# Patient Record
Sex: FEMALE | Race: WHITE | Employment: OTHER | URBAN - METROPOLITAN AREA
[De-identification: names, ages, dates, MRNs, and addresses within clinical notes are randomized per-mention and may not be internally consistent; named-entity substitution may affect disease eponyms.]

---

## 2020-07-22 ENCOUNTER — APPOINTMENT (OUTPATIENT)
Dept: ULTRASOUND IMAGING | Facility: CLINIC | Age: 55
End: 2020-07-22
Attending: PHYSICIAN ASSISTANT
Payer: COMMERCIAL

## 2020-07-22 ENCOUNTER — HOSPITAL ENCOUNTER (OUTPATIENT)
Facility: CLINIC | Age: 55
Setting detail: OBSERVATION
Discharge: HOME | End: 2020-07-24
Attending: PHYSICIAN ASSISTANT | Admitting: INTERNAL MEDICINE
Payer: COMMERCIAL

## 2020-07-22 DIAGNOSIS — R94.31 ABNORMAL ELECTROCARDIOGRAM: ICD-10-CM

## 2020-07-22 DIAGNOSIS — R03.0 ELEVATED BLOOD PRESSURE READING: Primary | ICD-10-CM

## 2020-07-22 DIAGNOSIS — R79.89 ELEVATED LFTS: ICD-10-CM

## 2020-07-22 DIAGNOSIS — Z90.49 S/P LAPAROSCOPIC CHOLECYSTECTOMY: ICD-10-CM

## 2020-07-22 DIAGNOSIS — K80.20 CHOLELITHIASES: ICD-10-CM

## 2020-07-22 DIAGNOSIS — R10.13 EPIGASTRIC PAIN: ICD-10-CM

## 2020-07-22 LAB
ALBUMIN SERPL-MCNC: 3.7 G/DL (ref 3.4–5)
ALBUMIN UR-MCNC: NEGATIVE MG/DL
ALP SERPL-CCNC: 161 U/L (ref 40–150)
ALT SERPL W P-5'-P-CCNC: 243 U/L (ref 0–50)
ANION GAP SERPL CALCULATED.3IONS-SCNC: 4 MMOL/L (ref 3–14)
APPEARANCE UR: CLEAR
AST SERPL W P-5'-P-CCNC: 415 U/L (ref 0–45)
BACTERIA #/AREA URNS HPF: ABNORMAL /HPF
BASOPHILS # BLD AUTO: 0 10E9/L (ref 0–0.2)
BASOPHILS NFR BLD AUTO: 0.2 %
BILIRUB DIRECT SERPL-MCNC: 0.5 MG/DL (ref 0–0.2)
BILIRUB SERPL-MCNC: 0.9 MG/DL (ref 0.2–1.3)
BILIRUB UR QL STRIP: NEGATIVE
BUN SERPL-MCNC: 8 MG/DL (ref 7–30)
CALCIUM SERPL-MCNC: 9.5 MG/DL (ref 8.5–10.1)
CHLORIDE SERPL-SCNC: 107 MMOL/L (ref 94–109)
CO2 SERPL-SCNC: 28 MMOL/L (ref 20–32)
COLOR UR AUTO: ABNORMAL
CREAT SERPL-MCNC: 0.7 MG/DL (ref 0.52–1.04)
DIFFERENTIAL METHOD BLD: NORMAL
EOSINOPHIL # BLD AUTO: 0.1 10E9/L (ref 0–0.7)
EOSINOPHIL NFR BLD AUTO: 0.8 %
ERYTHROCYTE [DISTWIDTH] IN BLOOD BY AUTOMATED COUNT: 12.5 % (ref 10–15)
GFR SERPL CREATININE-BSD FRML MDRD: >90 ML/MIN/{1.73_M2}
GLUCOSE SERPL-MCNC: 121 MG/DL (ref 70–99)
GLUCOSE UR STRIP-MCNC: NEGATIVE MG/DL
HCT VFR BLD AUTO: 42.4 % (ref 35–47)
HGB BLD-MCNC: 13.7 G/DL (ref 11.7–15.7)
HGB UR QL STRIP: NEGATIVE
IMM GRANULOCYTES # BLD: 0.1 10E9/L (ref 0–0.4)
IMM GRANULOCYTES NFR BLD: 0.6 %
KETONES UR STRIP-MCNC: NEGATIVE MG/DL
LEUKOCYTE ESTERASE UR QL STRIP: NEGATIVE
LIPASE SERPL-CCNC: 129 U/L (ref 73–393)
LYMPHOCYTES # BLD AUTO: 1.3 10E9/L (ref 0.8–5.3)
LYMPHOCYTES NFR BLD AUTO: 13.2 %
MCH RBC QN AUTO: 30.4 PG (ref 26.5–33)
MCHC RBC AUTO-ENTMCNC: 32.3 G/DL (ref 31.5–36.5)
MCV RBC AUTO: 94 FL (ref 78–100)
MONOCYTES # BLD AUTO: 0.6 10E9/L (ref 0–1.3)
MONOCYTES NFR BLD AUTO: 6.1 %
MUCOUS THREADS #/AREA URNS LPF: PRESENT /LPF
NEUTROPHILS # BLD AUTO: 7.8 10E9/L (ref 1.6–8.3)
NEUTROPHILS NFR BLD AUTO: 79.1 %
NITRATE UR QL: NEGATIVE
NRBC # BLD AUTO: 0 10*3/UL
NRBC BLD AUTO-RTO: 0 /100
PH UR STRIP: 7 PH (ref 5–7)
PLATELET # BLD AUTO: 235 10E9/L (ref 150–450)
POTASSIUM SERPL-SCNC: 3.9 MMOL/L (ref 3.4–5.3)
PROT SERPL-MCNC: 7.2 G/DL (ref 6.8–8.8)
RBC # BLD AUTO: 4.51 10E12/L (ref 3.8–5.2)
RBC #/AREA URNS AUTO: 0 /HPF (ref 0–2)
SODIUM SERPL-SCNC: 139 MMOL/L (ref 133–144)
SOURCE: ABNORMAL
SP GR UR STRIP: 1 (ref 1–1.03)
SQUAMOUS #/AREA URNS AUTO: 2 /HPF (ref 0–1)
TROPONIN I SERPL-MCNC: <0.015 UG/L (ref 0–0.04)
UROBILINOGEN UR STRIP-MCNC: NORMAL MG/DL (ref 0–2)
WBC # BLD AUTO: 9.9 10E9/L (ref 4–11)
WBC #/AREA URNS AUTO: 1 /HPF (ref 0–5)

## 2020-07-22 PROCEDURE — 93005 ELECTROCARDIOGRAM TRACING: CPT

## 2020-07-22 PROCEDURE — 84484 ASSAY OF TROPONIN QUANT: CPT | Performed by: PHYSICIAN ASSISTANT

## 2020-07-22 PROCEDURE — 80048 BASIC METABOLIC PNL TOTAL CA: CPT | Performed by: PHYSICIAN ASSISTANT

## 2020-07-22 PROCEDURE — 96375 TX/PRO/DX INJ NEW DRUG ADDON: CPT

## 2020-07-22 PROCEDURE — 80076 HEPATIC FUNCTION PANEL: CPT | Performed by: PHYSICIAN ASSISTANT

## 2020-07-22 PROCEDURE — U0003 INFECTIOUS AGENT DETECTION BY NUCLEIC ACID (DNA OR RNA); SEVERE ACUTE RESPIRATORY SYNDROME CORONAVIRUS 2 (SARS-COV-2) (CORONAVIRUS DISEASE [COVID-19]), AMPLIFIED PROBE TECHNIQUE, MAKING USE OF HIGH THROUGHPUT TECHNOLOGIES AS DESCRIBED BY CMS-2020-01-R: HCPCS | Performed by: PHYSICIAN ASSISTANT

## 2020-07-22 PROCEDURE — C9803 HOPD COVID-19 SPEC COLLECT: HCPCS

## 2020-07-22 PROCEDURE — 76705 ECHO EXAM OF ABDOMEN: CPT

## 2020-07-22 PROCEDURE — 96374 THER/PROPH/DIAG INJ IV PUSH: CPT

## 2020-07-22 PROCEDURE — 85025 COMPLETE CBC W/AUTO DIFF WBC: CPT | Performed by: PHYSICIAN ASSISTANT

## 2020-07-22 PROCEDURE — 83690 ASSAY OF LIPASE: CPT | Performed by: PHYSICIAN ASSISTANT

## 2020-07-22 PROCEDURE — 99285 EMERGENCY DEPT VISIT HI MDM: CPT | Mod: 25

## 2020-07-22 PROCEDURE — 25800030 ZZH RX IP 258 OP 636: Performed by: PHYSICIAN ASSISTANT

## 2020-07-22 PROCEDURE — 99220 ZZC INITIAL OBSERVATION CARE,LEVL III: CPT | Performed by: PHYSICIAN ASSISTANT

## 2020-07-22 PROCEDURE — 81001 URINALYSIS AUTO W/SCOPE: CPT | Performed by: PHYSICIAN ASSISTANT

## 2020-07-22 PROCEDURE — 25000128 H RX IP 250 OP 636: Performed by: PHYSICIAN ASSISTANT

## 2020-07-22 PROCEDURE — 96361 HYDRATE IV INFUSION ADD-ON: CPT

## 2020-07-22 PROCEDURE — G0378 HOSPITAL OBSERVATION PER HR: HCPCS

## 2020-07-22 RX ORDER — HYDROMORPHONE HYDROCHLORIDE 1 MG/ML
0.2 INJECTION, SOLUTION INTRAMUSCULAR; INTRAVENOUS; SUBCUTANEOUS
Status: DISCONTINUED | OUTPATIENT
Start: 2020-07-22 | End: 2020-07-23

## 2020-07-22 RX ORDER — OXYCODONE HYDROCHLORIDE 5 MG/1
5 TABLET ORAL EVERY 4 HOURS PRN
Status: DISCONTINUED | OUTPATIENT
Start: 2020-07-22 | End: 2020-07-23

## 2020-07-22 RX ORDER — ACETAMINOPHEN 650 MG/1
650 SUPPOSITORY RECTAL EVERY 4 HOURS PRN
Status: DISCONTINUED | OUTPATIENT
Start: 2020-07-22 | End: 2020-07-24 | Stop reason: HOSPADM

## 2020-07-22 RX ORDER — PROCHLORPERAZINE MALEATE 5 MG
10 TABLET ORAL EVERY 6 HOURS PRN
Status: DISCONTINUED | OUTPATIENT
Start: 2020-07-22 | End: 2020-07-24 | Stop reason: HOSPADM

## 2020-07-22 RX ORDER — AMOXICILLIN 250 MG
1 CAPSULE ORAL 2 TIMES DAILY PRN
Status: DISCONTINUED | OUTPATIENT
Start: 2020-07-22 | End: 2020-07-24 | Stop reason: HOSPADM

## 2020-07-22 RX ORDER — FAMOTIDINE 20 MG
1 TABLET ORAL DAILY
Status: ON HOLD | COMMUNITY
End: 2020-07-22

## 2020-07-22 RX ORDER — UBIDECARENONE 200 MG
1 CAPSULE ORAL DAILY
COMMUNITY

## 2020-07-22 RX ORDER — PROCHLORPERAZINE 25 MG
25 SUPPOSITORY, RECTAL RECTAL EVERY 12 HOURS PRN
Status: DISCONTINUED | OUTPATIENT
Start: 2020-07-22 | End: 2020-07-24 | Stop reason: HOSPADM

## 2020-07-22 RX ORDER — ONDANSETRON 4 MG/1
4 TABLET, ORALLY DISINTEGRATING ORAL EVERY 6 HOURS PRN
Status: DISCONTINUED | OUTPATIENT
Start: 2020-07-22 | End: 2020-07-24 | Stop reason: HOSPADM

## 2020-07-22 RX ORDER — AMOXICILLIN 250 MG
2 CAPSULE ORAL 2 TIMES DAILY PRN
Status: DISCONTINUED | OUTPATIENT
Start: 2020-07-22 | End: 2020-07-24 | Stop reason: HOSPADM

## 2020-07-22 RX ORDER — SODIUM CHLORIDE, SODIUM LACTATE, POTASSIUM CHLORIDE, CALCIUM CHLORIDE 600; 310; 30; 20 MG/100ML; MG/100ML; MG/100ML; MG/100ML
INJECTION, SOLUTION INTRAVENOUS CONTINUOUS
Status: DISCONTINUED | OUTPATIENT
Start: 2020-07-22 | End: 2020-07-24 | Stop reason: HOSPADM

## 2020-07-22 RX ORDER — ACETAMINOPHEN 325 MG/1
650 TABLET ORAL EVERY 4 HOURS PRN
Status: DISCONTINUED | OUTPATIENT
Start: 2020-07-22 | End: 2020-07-23

## 2020-07-22 RX ORDER — ONDANSETRON 2 MG/ML
4 INJECTION INTRAMUSCULAR; INTRAVENOUS EVERY 6 HOURS PRN
Status: DISCONTINUED | OUTPATIENT
Start: 2020-07-22 | End: 2020-07-24 | Stop reason: HOSPADM

## 2020-07-22 RX ORDER — ACETAMINOPHEN 325 MG/1
325-650 TABLET ORAL EVERY 6 HOURS PRN
COMMUNITY

## 2020-07-22 RX ORDER — NALOXONE HYDROCHLORIDE 0.4 MG/ML
.1-.4 INJECTION, SOLUTION INTRAMUSCULAR; INTRAVENOUS; SUBCUTANEOUS
Status: DISCONTINUED | OUTPATIENT
Start: 2020-07-22 | End: 2020-07-24 | Stop reason: HOSPADM

## 2020-07-22 RX ORDER — HYDRALAZINE HYDROCHLORIDE 20 MG/ML
10 INJECTION INTRAMUSCULAR; INTRAVENOUS EVERY 4 HOURS PRN
Status: DISCONTINUED | OUTPATIENT
Start: 2020-07-22 | End: 2020-07-23

## 2020-07-22 RX ADMIN — HYDRALAZINE HYDROCHLORIDE 10 MG: 20 INJECTION INTRAMUSCULAR; INTRAVENOUS at 21:21

## 2020-07-22 RX ADMIN — ONDANSETRON 4 MG: 2 INJECTION INTRAMUSCULAR; INTRAVENOUS at 18:36

## 2020-07-22 RX ADMIN — HYDROMORPHONE HYDROCHLORIDE 0.2 MG: 1 INJECTION, SOLUTION INTRAMUSCULAR; INTRAVENOUS; SUBCUTANEOUS at 18:41

## 2020-07-22 RX ADMIN — SODIUM CHLORIDE, POTASSIUM CHLORIDE, SODIUM LACTATE AND CALCIUM CHLORIDE: 600; 310; 30; 20 INJECTION, SOLUTION INTRAVENOUS at 17:51

## 2020-07-22 ASSESSMENT — MIFFLIN-ST. JEOR
SCORE: 1729.87
SCORE: 1781.13
SCORE: 1781.13

## 2020-07-22 ASSESSMENT — ENCOUNTER SYMPTOMS
VOMITING: 0
NAUSEA: 1
COUGH: 0
CHILLS: 1
FEVER: 0
ROS GI COMMENTS: GAS
SHORTNESS OF BREATH: 1
DIAPHORESIS: 1
BLOOD IN STOOL: 0
ABDOMINAL PAIN: 1

## 2020-07-22 NOTE — PLAN OF CARE
ROOM # 207    Living Situation (if not independent, order SW consult): Home w/ spouse and 2 children (adult children).   Facility name:  : Ejd: [phone]   Activity level at baseline: Independent  Activity level on admit: SBA    Patient registered to observation; given Patient Bill of Rights; given the opportunity to ask questions about observation status and their plan of care.  Patient has been oriented to the observation room, bathroom and call light is in place.    Discussed discharge goals and expectations with patient/family.

## 2020-07-22 NOTE — H&P
[facility] [place]    History and Physical - Hospitalist Service       Date of Admission:  7/22/2020    Assessment & Plan   Nan Stinson is a 54 year old female admitted on 7/22/2020 fo abdominal pain.    Symptomatic cholelithiasis with transaminitis  --RUQ US with cholelithiasis, stone in gallbladder neck with elevated LFTs.  Symptoms consistent with symptomatic cholelithiasis.  No radiographic evidence of cholecystitis, no leukocytosis or fever to necessitate IV antibiotics    PLAN:  --IVF  --clears tonight, NPO p MN in case of procedure  --surgery consult  --repeat LFTs in AM  --PRN oxycodone, IV dilaudid for breakthrough pain    Obesity       Diet: NPO until surgical evaluation/plan  DVT Prophylaxis: contraindicated due to possible procedure  Mckeon Catheter: not present  Code Status: FULL         Disposition Plan   Expected discharge: Tomorrow, recommended to prior living arrangement once pain controlled, surgical evaluation complete.  Entered: Rmaila Frey PA-C 07/22/2020, 3:47 PM         Ramila Frey PA-C  [facility] [place]    ______________________________________________________________________    Chief Complaint   Abdominal pain    History is obtained from the patient    History of Present Illness   Nan Stinson is a 54 year old female who presents with acute upper abdominal pain over past 3-4 days.  Notes band-like pain over epigastrium and radiating to RUQ.  Pain is episodic, lasting 30 minutes - 1 hour each time with total of 4 episodes over past 3-4 days.  Pain 9/10 at its worst.  No relation to eating.  No true dyspnea unless she is having pain.  Pain is similar but more severe and diffuse as compared to previous episodes of biliary colic.  Last had issues with biliary colic over 10 years ago.  Has felt nauseated but no emesis.  Currently feels uncomfortable but no current pain.  Took four pills of acetaminophen, unknown strength, and a couple of aspirin  without significant improvement.  Overnight last night and into this morning, the pain was unrelenting and subsequently she sought attention in the ED.      Review of Systems    The 10 point Review of Systems is negative other than noted in the HPI or here.     Past Medical History    I have reviewed this patient's medical history and updated it with pertinent information if needed.   Cholelithiasis  Pre-hypertension  Unspecified arrhythmia, details unclear    Past Surgical History   I have reviewed this patient's surgical history and updated it with pertinent information if needed.  No previous surgeries    Social History   I have reviewed this patient's social history and updated it with pertinent information if needed.  Lives with significant other.  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  Has 4-9 drinks per week.  Soon to be a new grandparent.        Family History     Both parents with adult-onset diabetes    Prior to Admission Medications   None   Tarina's wort  CoQ10    Allergies   No Known Allergies    Physical Exam   Vital Signs: Temp: 98.6  F (37  C) Temp src: Oral BP: (!) 150/80 Pulse: 64 Heart Rate: 58 Resp: 14 SpO2: 100 %      Weight: 250 lbs 0 oz    Constitutional: comfortable appearing woman sitting up in bed  Eyes: no icterus  HEENT: mucous membranes moist  Respiratory: clear bilaterally  Cardiovascular: RRR, no murmur  GI: normoactive bowel sounds, soft, nontender, no distention.  Negative miranda's sign  Lymph/Hematologic: no bruising  Genitourinary: no catheter  Skin: warm and dry  Musculoskeletal: normal muscle bulk and tone  Neurologic: nonfocal  Psychiatric: alert, oriented, appropriate.         Data   Data reviewed today: I reviewed all medications, new labs and imaging results over the last 24 hours. I personally reviewed the EKG tracing showing NSR, no acute ischemic changes.    Recent Labs   Lab 07/22/20  1309   WBC 9.9   HGB 13.7   MCV 94         POTASSIUM 3.9   CHLORIDE 107   CO2  28   BUN 8   CR 0.70   ANIONGAP 4   WALTER 9.5   *   ALBUMIN 3.7   PROTTOTAL 7.2   BILITOTAL 0.9   ALKPHOS 161*   *   *   LIPASE 129   TROPI <0.015     Recent Results (from the past 24 hour(s))   US Abdomen Limited    Narrative    ULTRASOUND ABDOMEN LIMITED 7/22/2020 2:30 PM    CLINICAL HISTORY: Right upper quadrant pain. Gallstones, right upper  quadrant.    TECHNIQUE: Limited abdominal ultrasound.    COMPARISON: None.    FINDINGS:    GALLBLADDER: Negative sonographic Morales sign. Cholelithiasis. A  gallstone is noted at the gallbladder neck. Distended gallbladder. No  gallbladder wall thickening.    BILE DUCTS: There is no biliary dilatation. The common duct measures  4mm.    LIVER: Fatty liver. No focal liver lesion identified. Liver is 17.5  cm.    RIGHT KIDNEY: No hydronephrosis.    PANCREAS: The visualized portions of the pancreas are normal.    No ascites.      Impression    IMPRESSION:  1.  Negative sonographic Morales sign. However, there is cholelithiasis  with a gallstone seen at the gallbladder neck and there is distention  of the gallbladder. No biliary dilatation.  2.  Fatty liver. Mildly enlarged liver.    TRENT QUIROZ MD

## 2020-07-22 NOTE — ED PROVIDER NOTES
"  History     Chief Complaint:  Abdominal Pain    HPI   Nan Stinson is a 54 year old female with a history of gallstones and heart arrhythmia who presents with abdominal pain. The patient reports that for the past two week she has had episodic pain in her upper abdomen and for the past 48 hours this is become more constant.  She denies fever, chest pain, shortness of breath.  She has felt nauseous but has not vomited.  No pain with urination.  No bloody stools and she has been having normal bowel movements.  She does note a prior history of gallbladder episode about 10 years ago.  She denies any cough.  She notes that in the past hour or 2 the pain has let up somewhat.  She notes she drinks alcohol socially but no more than 7-10 drinks per week.  She denies a history of smoking.  No family history of early CHF.  Allergies:  No Known Drug Allergies     Medications:    Medications reviewed. No pertinent medications.    Past Medical History:    Depressive disorder  Elevated blood pressure  Edema   Palpitations   Lipoma of other skin and subcutaneous tissue  Allergic rhinitis    Past Surgical History:    Surgical history reviewed. No pertinent surgical history.    Family History:    Father: heart disease    Social History:  Smoking Status: Never Smoker  Smokeless Tobacco: Never Used  Alcohol Use: Negative  Drug Use: Negative  Marital Status:       Review of Systems   All other systems reviewed and are negative.    Physical Exam     Patient Vitals for the past 24 hrs:   BP Temp Temp src Pulse Heart Rate Resp SpO2 Height Weight   07/22/20 1431 -- -- -- -- 58 14 100 % -- --   07/22/20 1430 (!) 150/80 -- -- 64 60 -- -- -- --   07/22/20 1253 (!) 176/89 98.6  F (37  C) Oral 72 -- 20 100 % 1.651 m (5' 5\") 113.4 kg (250 lb)     Physical Exam  General: Alert and cooperative with exam. Resting comfortably on gurney  Head:  Scalp is NC/AT  Eyes:  No scleral icterus, PERRL  ENT:  The external nose and ears are " normal.   Neck:  Normal range of motion without rigidity.  CV:  Regular rate and rhythm    No pathologic murmur, rubs, or gallops.  Resp:  Breath sounds are clear bilaterally.  No crackles, wheezes, rhonchi, stridor.    Non-labored, no retractions or accessory muscle use  GI:  Abdomen is soft, no distension, Minimal epigastric and RUQ tenderness, no masses. No peritoneal signs.  Bowel sounds present in all quadrants  :  No suprapubic or flank tenderness  MS:  No lower extremity edema or asymmetric calf swelling. Normal ROM in all joints without effusions.    No midline cervical, thoracic, or lumbar tenderness  Skin:  Warm and dry, No rash or lesions noted. 2+ peripheral pulses in all extremities  Neuro: Oriented. No gross motor deficits. GCS 15    Strength and sensation grossly intact in all 4 extremities.   Psych: Awake. Alert. Normal affect. Appropriate interactions.    Emergency Department Course     ECG:  ECG taken at 1356  Normal sinus rhythm   Incomplete right bundle branch block   ST and T wave abnormality, consider inferior ischemia   Abnormal ECG  Mild depression 11, 111, aVF S V3-V5   Rate 65 bpm. MN interval 174 ms. QRS duration 96 ms. QT/QTc 400/416 ms. P-R-T axes 66 23 -1.    Imaging:  Radiology findings were communicated with the patient who voiced understanding of the findings.    US Abdomen Limited  1.  Negative sonographic Morales sign. However, there is cholelithiasis  with a gallstone seen at the gallbladder neck and there is distention  of the gallbladder. No biliary dilatation.  2.  Fatty liver. Mildly enlarged liver.   Reading per radiology     Laboratory:  Laboratory findings were communicated with the patient  who voiced understanding of the findings.    CBC: WBC 9.9, HGB 13.7,   BMP: glucose 121 (H) o/w WNL (Creatinine 0.70)  Hepatic panel: bilirubin direct 0.5 (H), alkphos 161 (H), ast 415 (H)  Troponin (Collected 1309): <0.015  Lipase: 129   UA with micro: bacteria few (!),  squamous epithelial urine 2 (H), mucous urine present (!) o/w negative    Emergency Department Course:   Nursing notes and vitals reviewed. I performed an exam of the patient as documented above.     1309 IV was inserted and blood was drawn for laboratory testing, results above.    1309 The patient provided a urine sample here in the emergency department. This was sent for laboratory testing, findings above.    1356 EKG obtained as noted above.    1408 The patient was sent for a US while in the emergency department, results above.      I spoke with Dr. Jiménez of the hospitalist service from [facility] regarding patient's presentation, findings, and plan of care.     Prior to admit, I personally reviewed the results with the patient and all related questions were answered. The patient verbalized understanding and is amenable to plan.     Findings and plan explained to the Patient who consents to admission. Discussed the patient with Dr. Jiménez, who will admit the patient to a observation bed for further monitoring, evaluation, and treatment.    Impression & Plan    Medical Decision Making:  Nan Stinson is a 54 year old female who presents to the emergency department today for evaluation of epigastric abdominal pain.  History and records reviewed.  Work-up here notable for elevated LFTs, right upper quadrant ultrasound showing cholelithiasis with stone in gallbladder neck.  This is the likely etiology of the patient's symptoms.  Discussed with surgery who will consult on the patient and plan for cholecystectomy tomorrow.  bilirubin not significantly elevated and no obvious evidence of choledocholithiasis at this time.  Afebrile with normal white count and no signs of cholangitis.  Abdominal exam is otherwise benign with no lower abdominal tenderness to suggest appendicitis or diverticulitis.  EKG does show some concerning ST depression, though troponin is undetectable and I do feel her symptoms are  most likely due to the gallbladder.  Nevertheless she will be admitted for serial troponins and further evaluation.  No other chest pain and shortness of breath and I feel other etiologies such as pulmonary embolism or aortic dissection are very unlikely.  Discussed with hospitalist who agrees to accept the patient for admission.  Patient consents to admission and all questions were answered.    Diagnosis:    ICD-10-CM    1. Elevated LFTs  R79.89    2. Abnormal electrocardiogram  R94.31    3. Epigastric pain  R10.13    4. Cholelithiases  K80.20      Disposition:   The patient is admitted into the care of Dr. Jiménez.    Scribe Disclosure:  I, Cassandra Stinson, am serving as a scribe at 1:44 PM on 7/22/2020 to document services personally performed by Obie Nevarez, based on my observations and the provider's statements to me.       Obie Nevarez PA-C  07/22/20 1921

## 2020-07-22 NOTE — PHARMACY-ADMISSION MEDICATION HISTORY
Admission medication history interview status for this patient is complete. See [facility] admission navigator for allergy information, prior to admission medications and immunization status.     Medication history interview done via telephone during Covid-19 pandemic, indicate source(s): Patient  Medication history resources (including written lists, pill bottles, clinic record):None    Changes made to PTA medication list:  Added: All  Deleted: None  Changed: None    Actions taken by pharmacist (provider contacted, etc):None     Additional medication history information:None    Medication reconciliation/reorder completed by provider prior to medication history?  No   (Y/N)     For patients on insulin therapy: No  (Y/N)      Prior to Admission medications    Medication Sig Last Dose Taking? Auth Provider   acetaminophen (TYLENOL) 325 MG tablet Take 325-650 mg by mouth every 6 hours as needed for mild pain  Yes Unknown, Entered By History   coenzyme Q-10 200 MG CAPS Take 1 capsule by mouth daily 7/22/2020 at Unknown time Yes Unknown, Entered By History   UNABLE TO FIND Take by mouth daily MEDICATION NAME: A tincture containing Hilary's Wort and Ashwagandha 7/22/2020 at Unknown time Yes Unknown, Entered By History

## 2020-07-22 NOTE — ED TRIAGE NOTES
"3 day hx of intermittent abdominal pain. Pt states \"its a tight band across my upper abdomen that goes to my back.\" Pt c/o nausea.   "

## 2020-07-22 NOTE — ED PROVIDER NOTES
History     Chief Complaint:  Abdominal Pain    HPI   Nan Stinson is a 54 year old female with a history of gallstones and heart arrhythmia who presents with abdominal pain. The patient reports that for the past two week she has had episodic pain in her upper abdomen and for the past three days the pain has been present everyday. She has associated nausea, gas, cold sweats, and difficulty breathing. She states the pain was a 9/10 this morning, but has been fluctuating throughout the day and currently she says her abdomen just feels like there is pressure. The pain is different to her previous gallstones, as she said that this pain is more broader and radiates more to her back and that the pain from the gallstones were related to food intake . She took acetaminophen and water for pain management, but the pain never completely went away. She went to urgent care for her pain, but was directed to the emergency department. She denies vomiting, fever, cough, bloody stools, leg pain, leg swelling, or surgery on her abdomen.     Allergies:  No Known Drug Allergies     Medications:    Medications reviewed. No pertinent medications.    Past Medical History:    Depressive disorder  Elevated blood pressure  Edema   Palpitations   Lipoma of other skin and subcutaneous tissue  Allergic rhinitis    Past Surgical History:    Surgical history reviewed. No pertinent surgical history.    Family History:    Father: heart disease    Social History:  Smoking Status: Never Smoker  Smokeless Tobacco: Never Used  Alcohol Use: Negative  Drug Use: Negative  Marital Status:         Review of Systems   Constitutional: Positive for chills and diaphoresis. Negative for fever.   Respiratory: Positive for shortness of breath. Negative for cough.    Cardiovascular: Negative for leg swelling.   Gastrointestinal: Positive for abdominal pain and nausea. Negative for blood in stool and vomiting.        Gas   Musculoskeletal:         "No leg pain     ***    Physical Exam     Patient Vitals for the past 24 hrs:   BP Temp Temp src Pulse Resp SpO2 Height Weight   07/22/20 1253 (!) 176/89 98.6  F (37  C) Oral 72 20 100 % 1.651 m (5' 5\") 113.4 kg (250 lb)     Physical Exam  ***  General: Alert and cooperative with exam. Resting comfortably on gurney  Head:  Scalp is NC/AT  Eyes:  No scleral icterus, PERRL  ENT:  The external nose and ears are normal.     ***The oropharynx is normal and without erythema or tonsillar swelling. Uvula midline, no submandibular swelling, sublingual swelling, trismus.  TM's normal BL, no mastoid swelling or tenderness***  Neck:  Normal range of motion without rigidity.  CV:  Regular rate and rhythm    No pathologic murmur, rubs, or gallops.  Resp:  Breath sounds are clear bilaterally.  No crackles, wheezes, rhonchi, stridor.    Non-labored, no retractions or accessory muscle use  GI:  Abdomen is soft, no distension, no tenderness, no masses. No peritoneal signs.  Bowel sounds present in all quadrants  :  No suprapubic or flank tenderness  MS:  No lower extremity edema or asymmetric calf swelling. Normal ROM in all joints without effusions.    No midline cervical, thoracic, or lumbar tenderness  Skin:  Warm and dry, No rash or lesions noted. 2+ peripheral pulses in all extremities  Neuro:  Oriented. No gross motor deficits. GCS 15    ***Strength and sensation grossly intact in all 4 extremities.  Cranial nerves 2-12 intact.  Normal finger to nose and heel to shin testing.  Gait normal***  Psych: Awake. Alert. Normal affect. Appropriate interactions.    Emergency Department Course     ECG:  ECG taken at ***, ECG read at ***  Normal sinus rhythm***  *** ECG  ***  Rate *** bpm. MI interval *** ms. QRS duration *** ms. QT/QTc *** ms. P-R-T axes *** *** ***.    Imaging:  Radiology findings were communicated with the *** who voiced understanding of the findings.    No orders to display        Reading per radiology " "    Laboratory:  Laboratory findings were communicated with the *** who voiced understanding of the findings.    ***  CBC: WBC ***, HGB ***, PLT ***  ***MP: *** o/w WNL (Creatinine ***)  ***    Procedures:    Procedures:      Interventions:  Medications - No data to display     Emergency Department Course:    *** Nursing notes and vitals reviewed. I performed an exam of the patient as documented above.     *** ***    *** ***    *** Prior to ***, I personally reviewed the results with the *** and all related questions were answered. The *** verbalized understanding and is amenable to plan.     Impression & Plan      CMS Diagnoses: {Sepsis/Septic Shock/Stemi/Stroke:783956::\" \"}     {trauma activation?:222192::\" \"}    Medical Decision Making:  Nan Stinson is a 54 year old female who presents to the emergency department today for evaluation of ***    Critical Care time was *** minutes for this patient excluding procedures.     Diagnosis:  ***  No diagnosis found.    Disposition:   ***    Discharge Medications:  ***  New Prescriptions    No medications on file     Scribe Disclosure:  I, Cassandra Stinson, am serving as a scribe at 1:44 PM on 7/22/2020 to document services personally performed by Obie Nevarez, based on my observations and the provider's statements to me.    "

## 2020-07-22 NOTE — ED PROVIDER NOTES
Emergency Department Attending Supervision Note  7/22/2020  2:43 PM      I evaluated this patient in conjunction with Obie Nevarez PA-C.      Briefly, the patient presented for evaluation of epigastric pain. Over the last two weeks, the patient states this pain has been intermittent, but it has been constant in the last three days. It also radiates into her back. It is associated with nausea, distention, diaphoresis, and difficulty breathing. It feels different from her previous gallstone pain and it is not associated with eating. She has been taking Tylenol for the pain at home without complete relief. She denies any vomiting, stool changes, fever, cough, shortness of breath, or other concerning symptoms. No history of abdominal surgery.       On my exam,   General: Resting on the bed.  Head: No obvious trauma to head.  Ears, Nose, Throat:  External ears normal.  Nose normal.    Eyes:  Conjunctivae clear.  Pupils are equal, round, and reactive.   Neck: Normal range of motion.  Neck supple.   CV: Regular rate and rhythm.  No murmurs.  2+ radial pulses     Respiratory: Effort normal and breath sounds normal.  No wheezing or crackles.   Gastrointestinal: Soft.  No distension. There is epigastric and RUQ tenderness.  There is no rigidity, no rebound and no guarding.   Musculoskeletal: Non tender non edematous calves  Neuro: Alert. Moving all extremities appropriately.  Normal speech.    Skin: Skin is warm and dry.  No rash noted.     Results:     ECG:  ECG taken at 1356  Normal sinus rhythm   Incomplete right bundle branch block   ST and T wave abnormality, consider inferior ischemia   Abnormal ECG  Mild depression 11, 111, aVF S V3-V5   Rate 65 bpm. NJ interval 174 ms. QRS duration 96 ms. QT/QTc 400/416 ms. P-R-T axes 66 23 -1.    Imaging:   US Abdomen Limited   Preliminary Result   IMPRESSION:   1.  Negative sonographic Morales sign. However, there is cholelithiasis   with a gallstone seen at the gallbladder neck and  there is distention   of the gallbladder. No biliary dilatation.   2.  Fatty liver. Mildly enlarged liver.         Laboratory:   Labs Ordered and Resulted from Time of ED Arrival Up to the Time of Departure from the ED   BASIC METABOLIC PANEL - Abnormal; Notable for the following components:       Result Value    Glucose 121 (*)     All other components within normal limits   ROUTINE UA WITH MICROSCOPIC - Abnormal; Notable for the following components:    Bacteria Urine Few (*)     Squamous Epithelial /HPF Urine 2 (*)     Mucous Urine Present (*)     All other components within normal limits   HEPATIC PANEL - Abnormal; Notable for the following components:    Bilirubin Direct 0.5 (*)     Alkaline Phosphatase 161 (*)      (*)      (*)     All other components within normal limits   LIPASE   CBC WITH PLATELETS DIFFERENTIAL   TROPONIN I        ED course:  1440: Obie Nevarez PA-C, shared service with me as he plans to admit the patient. Please see his note for the initial work up.     1447: I performed an exam of the patient, as documented above.     MDM:  Nan Stinson is a 54 year old female who presents with abdominal pain concerning for biliary colic.  Vital signs are reassuring.  Broad differential was pursued including not limited to cholelithiasis, choledocholithiasis, hepatitis, pancreatitis, ACS, arrhythmia, electrolyte, metabolic, renal dysfunction.  CBC shows no leukocytosis or anemia.  BMP shows no acute electrolyte, metabolic or renal dysfunction.  LFTs do show an elevated ALT, AST and alk phos.  Lipase is normal not concerning for pancreatitis.   Ultrasound shows cholelithiasis, as well as a gallstone seen at the gallbladder neck with associated distention.  With elevated LFTs and evidence of cholelithiasis will admit for possible surgical consultation and cholecystectomy.  No evidence of acute cholecystitis.  EKG was obtained and did show evidence of ST depression, concerning for  possible acute coronary syndrome.  Patient denies any chest pain or shortness of breath at this time.  Troponin is negative.  No prior EKGs for comparison, therefore will admit for ACS rule out.  Given cholelithiasis with elevated LFTs and abnormal EKG, plan will be to admit to the hospital for further work up. Obie Nevarez PA-C, spoke with the hospitalist who accepts the patient for admission.      Diagnosis    ICD-10-CM    1. Elevated LFTs  R79.89    2. Abnormal electrocardiogram  R94.31    3. Epigastric pain  R10.13    4. Cholelithiases  K80.20          Jazmine Galicia MD Bennett, Jennifer L, MD  07/22/20 1548

## 2020-07-22 NOTE — ED NOTES
"[facility]  ED Nurse Handoff Report    Nan Stinson is a 54 year old female   ED Chief complaint: Abdominal Pain  . ED Diagnosis:   Final diagnoses:   None     Allergies: No Known Allergies    Code Status: Full Code  Activity level - Baseline/Home:  Independent. Activity Level - Current:   Independent. Lift room needed: No. Bariatric: No   Needed: No   Isolation: No. Infection: Not Applicable.     Vital Signs:   Vitals:    07/22/20 1253 07/22/20 1430 07/22/20 1431   BP: (!) 176/89 (!) 150/80    Pulse: 72 64    Resp: 20  14   Temp: 98.6  F (37  C)     TempSrc: Oral     SpO2: 100%  100%   Weight: 113.4 kg (250 lb)     Height: 1.651 m (5' 5\")         Cardiac Rhythm:  ,      Pain level: 0-10 Pain Scale: 9  Patient confused: No. Patient Falls Risk: No.   Elimination Status: Has voided   Patient Report - Initial Complaint: Abdominal Pain. Focused Assessment: Patient reports intermittent, bilateral upper quadrant pain. Nausea associated with the pain. Denies changes to bowel or bladder.  Tests Performed: EKG, Lab work, UA, Ultrasound. Abnormal Results:   Labs Ordered and Resulted from Time of ED Arrival Up to the Time of Departure from the ED   BASIC METABOLIC PANEL - Abnormal; Notable for the following components:       Result Value    Glucose 121 (*)     All other components within normal limits   ROUTINE UA WITH MICROSCOPIC - Abnormal; Notable for the following components:    Bacteria Urine Few (*)     Squamous Epithelial /HPF Urine 2 (*)     Mucous Urine Present (*)     All other components within normal limits   HEPATIC PANEL - Abnormal; Notable for the following components:    Bilirubin Direct 0.5 (*)     Alkaline Phosphatase 161 (*)      (*)      (*)     All other components within normal limits   LIPASE   CBC WITH PLATELETS DIFFERENTIAL   TROPONIN I     US Abdomen Limited   Preliminary Result   IMPRESSION:   1.  Negative sonographic Morales sign. However, there is " cholelithiasis   with a gallstone seen at the gallbladder neck and there is distention   of the gallbladder. No biliary dilatation.   2.  Fatty liver. Mildly enlarged liver.        .   Treatments provided: N/A  Family Comments: Family at bedside  OBS brochure/video discussed/provided to patient:  Yes  ED Medications: Medications - No data to display  Drips infusing:  No  For the majority of the shift, the patient's behavior Green. Interventions performed were N/A.    Sepsis treatment initiated: No       ED Nurse Name/Phone Number: Nathalia Phelan RN,   2:53 PM    RECEIVING UNIT ED HANDOFF REVIEW    Above ED Nurse Handoff Report was reviewed: Yes  Reviewed by: Mitali Goodwin RN on July 22, 2020 at 4:32 PM

## 2020-07-23 ENCOUNTER — APPOINTMENT (OUTPATIENT)
Dept: CARDIOLOGY | Facility: CLINIC | Age: 55
End: 2020-07-23
Attending: INTERNAL MEDICINE
Payer: COMMERCIAL

## 2020-07-23 ENCOUNTER — APPOINTMENT (OUTPATIENT)
Dept: SURGERY | Facility: PHYSICIAN GROUP | Age: 55
End: 2020-07-23
Payer: COMMERCIAL

## 2020-07-23 ENCOUNTER — ANESTHESIA EVENT (OUTPATIENT)
Dept: SURGERY | Facility: CLINIC | Age: 55
End: 2020-07-23
Payer: COMMERCIAL

## 2020-07-23 ENCOUNTER — SURGERY (OUTPATIENT)
Age: 55
End: 2020-07-23
Payer: COMMERCIAL

## 2020-07-23 ENCOUNTER — ANESTHESIA (OUTPATIENT)
Dept: SURGERY | Facility: CLINIC | Age: 55
End: 2020-07-23
Payer: COMMERCIAL

## 2020-07-23 ENCOUNTER — APPOINTMENT (OUTPATIENT)
Dept: GENERAL RADIOLOGY | Facility: CLINIC | Age: 55
End: 2020-07-23
Attending: INTERNAL MEDICINE
Payer: COMMERCIAL

## 2020-07-23 LAB
ALBUMIN SERPL-MCNC: 3.4 G/DL (ref 3.4–5)
ALP SERPL-CCNC: 183 U/L (ref 40–150)
ALT SERPL W P-5'-P-CCNC: 562 U/L (ref 0–50)
ANION GAP SERPL CALCULATED.3IONS-SCNC: 4 MMOL/L (ref 3–14)
AST SERPL W P-5'-P-CCNC: 481 U/L (ref 0–45)
BILIRUB SERPL-MCNC: 0.9 MG/DL (ref 0.2–1.3)
BUN SERPL-MCNC: 7 MG/DL (ref 7–30)
CALCIUM SERPL-MCNC: 8.6 MG/DL (ref 8.5–10.1)
CHLORIDE SERPL-SCNC: 107 MMOL/L (ref 94–109)
CO2 SERPL-SCNC: 29 MMOL/L (ref 20–32)
CREAT SERPL-MCNC: 0.72 MG/DL (ref 0.52–1.04)
GFR SERPL CREATININE-BSD FRML MDRD: >90 ML/MIN/{1.73_M2}
GLUCOSE SERPL-MCNC: 116 MG/DL (ref 70–99)
INTERPRETATION ECG - MUSE: NORMAL
POTASSIUM SERPL-SCNC: 4.2 MMOL/L (ref 3.4–5.3)
PROT SERPL-MCNC: 6.7 G/DL (ref 6.8–8.8)
SARS-COV-2 RNA SPEC QL NAA+PROBE: NOT DETECTED
SODIUM SERPL-SCNC: 140 MMOL/L (ref 133–144)
SPECIMEN SOURCE: NORMAL
TROPONIN I SERPL-MCNC: <0.015 UG/L (ref 0–0.04)

## 2020-07-23 PROCEDURE — 36000060 ZZH SURGERY LEVEL 3 W FLUORO 1ST 30 MIN: Performed by: SURGERY

## 2020-07-23 PROCEDURE — 88304 TISSUE EXAM BY PATHOLOGIST: CPT | Mod: 26 | Performed by: SURGERY

## 2020-07-23 PROCEDURE — 25800030 ZZH RX IP 258 OP 636: Performed by: ANESTHESIOLOGY

## 2020-07-23 PROCEDURE — 80053 COMPREHEN METABOLIC PANEL: CPT | Performed by: PHYSICIAN ASSISTANT

## 2020-07-23 PROCEDURE — 96361 HYDRATE IV INFUSION ADD-ON: CPT | Mod: 59

## 2020-07-23 PROCEDURE — 36000058 ZZH SURGERY LEVEL 3 EA 15 ADDTL MIN: Performed by: SURGERY

## 2020-07-23 PROCEDURE — 40000264 ECHOCARDIOGRAM COMPLETE

## 2020-07-23 PROCEDURE — 27210794 ZZH OR GENERAL SUPPLY STERILE: Performed by: SURGERY

## 2020-07-23 PROCEDURE — 25000128 H RX IP 250 OP 636: Performed by: NURSE ANESTHETIST, CERTIFIED REGISTERED

## 2020-07-23 PROCEDURE — 47563 LAPARO CHOLECYSTECTOMY/GRAPH: CPT | Performed by: SURGERY

## 2020-07-23 PROCEDURE — 93005 ELECTROCARDIOGRAM TRACING: CPT | Mod: 59

## 2020-07-23 PROCEDURE — 25500064 ZZH RX 255 OP 636: Performed by: INTERNAL MEDICINE

## 2020-07-23 PROCEDURE — 71000012 ZZH RECOVERY PHASE 1 LEVEL 1 FIRST HR: Performed by: SURGERY

## 2020-07-23 PROCEDURE — 93306 TTE W/DOPPLER COMPLETE: CPT | Mod: 26 | Performed by: INTERNAL MEDICINE

## 2020-07-23 PROCEDURE — 25000128 H RX IP 250 OP 636: Performed by: INTERNAL MEDICINE

## 2020-07-23 PROCEDURE — 37000009 ZZH ANESTHESIA TECHNICAL FEE, EACH ADDTL 15 MIN: Performed by: SURGERY

## 2020-07-23 PROCEDURE — 99226 ZZC SUBSEQUENT OBSERVATION CARE,LEVEL III: CPT | Performed by: PHYSICIAN ASSISTANT

## 2020-07-23 PROCEDURE — 25000132 ZZH RX MED GY IP 250 OP 250 PS 637: Performed by: PHYSICIAN ASSISTANT

## 2020-07-23 PROCEDURE — 93010 ELECTROCARDIOGRAM REPORT: CPT | Performed by: INTERNAL MEDICINE

## 2020-07-23 PROCEDURE — 40000306 ZZH STATISTIC PRE PROC ASSESS II: Performed by: SURGERY

## 2020-07-23 PROCEDURE — 84484 ASSAY OF TROPONIN QUANT: CPT | Performed by: INTERNAL MEDICINE

## 2020-07-23 PROCEDURE — 25000128 H RX IP 250 OP 636: Performed by: ANESTHESIOLOGY

## 2020-07-23 PROCEDURE — 25000128 H RX IP 250 OP 636: Performed by: PHYSICIAN ASSISTANT

## 2020-07-23 PROCEDURE — 84484 ASSAY OF TROPONIN QUANT: CPT | Performed by: PHYSICIAN ASSISTANT

## 2020-07-23 PROCEDURE — 25000128 H RX IP 250 OP 636: Performed by: SURGERY

## 2020-07-23 PROCEDURE — 25000132 ZZH RX MED GY IP 250 OP 250 PS 637: Performed by: INTERNAL MEDICINE

## 2020-07-23 PROCEDURE — 36415 COLL VENOUS BLD VENIPUNCTURE: CPT | Performed by: INTERNAL MEDICINE

## 2020-07-23 PROCEDURE — 25000125 ZZHC RX 250: Performed by: NURSE ANESTHETIST, CERTIFIED REGISTERED

## 2020-07-23 PROCEDURE — 25800030 ZZH RX IP 258 OP 636: Performed by: SURGERY

## 2020-07-23 PROCEDURE — G0378 HOSPITAL OBSERVATION PER HR: HCPCS

## 2020-07-23 PROCEDURE — 25000125 ZZHC RX 250: Performed by: SURGERY

## 2020-07-23 PROCEDURE — 40000277 XR SURGERY CARM FLUORO LESS THAN 5 MIN W STILLS

## 2020-07-23 PROCEDURE — 96375 TX/PRO/DX INJ NEW DRUG ADDON: CPT

## 2020-07-23 PROCEDURE — 47563 LAPARO CHOLECYSTECTOMY/GRAPH: CPT | Mod: AS | Performed by: PHYSICIAN ASSISTANT

## 2020-07-23 PROCEDURE — 99221 1ST HOSP IP/OBS SF/LOW 40: CPT | Mod: 57 | Performed by: SURGERY

## 2020-07-23 PROCEDURE — 88304 TISSUE EXAM BY PATHOLOGIST: CPT | Performed by: SURGERY

## 2020-07-23 PROCEDURE — 36415 COLL VENOUS BLD VENIPUNCTURE: CPT | Performed by: PHYSICIAN ASSISTANT

## 2020-07-23 PROCEDURE — 25500064 ZZH RX 255 OP 636: Performed by: SURGERY

## 2020-07-23 PROCEDURE — 96376 TX/PRO/DX INJ SAME DRUG ADON: CPT | Mod: 59

## 2020-07-23 PROCEDURE — 25000132 ZZH RX MED GY IP 250 OP 250 PS 637: Performed by: SURGERY

## 2020-07-23 PROCEDURE — 37000008 ZZH ANESTHESIA TECHNICAL FEE, 1ST 30 MIN: Performed by: SURGERY

## 2020-07-23 RX ORDER — HYDROMORPHONE HYDROCHLORIDE 1 MG/ML
0.5 INJECTION, SOLUTION INTRAMUSCULAR; INTRAVENOUS; SUBCUTANEOUS
Status: DISCONTINUED | OUTPATIENT
Start: 2020-07-23 | End: 2020-07-24 | Stop reason: HOSPADM

## 2020-07-23 RX ORDER — HYDRALAZINE HYDROCHLORIDE 20 MG/ML
20 INJECTION INTRAMUSCULAR; INTRAVENOUS EVERY 4 HOURS PRN
Status: DISCONTINUED | OUTPATIENT
Start: 2020-07-23 | End: 2020-07-23

## 2020-07-23 RX ORDER — HYDROMORPHONE HYDROCHLORIDE 1 MG/ML
.3-.5 INJECTION, SOLUTION INTRAMUSCULAR; INTRAVENOUS; SUBCUTANEOUS EVERY 10 MIN PRN
Status: DISCONTINUED | OUTPATIENT
Start: 2020-07-23 | End: 2020-07-23 | Stop reason: HOSPADM

## 2020-07-23 RX ORDER — SODIUM CHLORIDE, SODIUM LACTATE, POTASSIUM CHLORIDE, CALCIUM CHLORIDE 600; 310; 30; 20 MG/100ML; MG/100ML; MG/100ML; MG/100ML
INJECTION, SOLUTION INTRAVENOUS CONTINUOUS
Status: DISCONTINUED | OUTPATIENT
Start: 2020-07-23 | End: 2020-07-23 | Stop reason: HOSPADM

## 2020-07-23 RX ORDER — HYDRALAZINE HYDROCHLORIDE 20 MG/ML
2.5-5 INJECTION INTRAMUSCULAR; INTRAVENOUS EVERY 10 MIN PRN
Status: DISCONTINUED | OUTPATIENT
Start: 2020-07-23 | End: 2020-07-23 | Stop reason: HOSPADM

## 2020-07-23 RX ORDER — ALBUTEROL SULFATE 0.83 MG/ML
2.5 SOLUTION RESPIRATORY (INHALATION) EVERY 4 HOURS PRN
Status: DISCONTINUED | OUTPATIENT
Start: 2020-07-23 | End: 2020-07-23 | Stop reason: HOSPADM

## 2020-07-23 RX ORDER — NALOXONE HYDROCHLORIDE 0.4 MG/ML
.1-.4 INJECTION, SOLUTION INTRAMUSCULAR; INTRAVENOUS; SUBCUTANEOUS
Status: DISCONTINUED | OUTPATIENT
Start: 2020-07-23 | End: 2020-07-23

## 2020-07-23 RX ORDER — LORAZEPAM 0.5 MG/1
0.5 TABLET ORAL EVERY 4 HOURS PRN
Status: DISCONTINUED | OUTPATIENT
Start: 2020-07-23 | End: 2020-07-24 | Stop reason: HOSPADM

## 2020-07-23 RX ORDER — NALOXONE HYDROCHLORIDE 0.4 MG/ML
.1-.4 INJECTION, SOLUTION INTRAMUSCULAR; INTRAVENOUS; SUBCUTANEOUS
Status: DISCONTINUED | OUTPATIENT
Start: 2020-07-23 | End: 2020-07-23 | Stop reason: HOSPADM

## 2020-07-23 RX ORDER — LIDOCAINE 40 MG/G
CREAM TOPICAL
Status: DISCONTINUED | OUTPATIENT
Start: 2020-07-23 | End: 2020-07-23 | Stop reason: HOSPADM

## 2020-07-23 RX ORDER — ONDANSETRON 4 MG/1
4 TABLET, ORALLY DISINTEGRATING ORAL EVERY 30 MIN PRN
Status: DISCONTINUED | OUTPATIENT
Start: 2020-07-23 | End: 2020-07-23 | Stop reason: HOSPADM

## 2020-07-23 RX ORDER — PROPOFOL 10 MG/ML
INJECTION, EMULSION INTRAVENOUS CONTINUOUS PRN
Status: DISCONTINUED | OUTPATIENT
Start: 2020-07-23 | End: 2020-07-23

## 2020-07-23 RX ORDER — OXYCODONE HYDROCHLORIDE 5 MG/1
5-10 TABLET ORAL
Status: DISCONTINUED | OUTPATIENT
Start: 2020-07-23 | End: 2020-07-24 | Stop reason: HOSPADM

## 2020-07-23 RX ORDER — PROPOFOL 10 MG/ML
INJECTION, EMULSION INTRAVENOUS PRN
Status: DISCONTINUED | OUTPATIENT
Start: 2020-07-23 | End: 2020-07-23

## 2020-07-23 RX ORDER — METOPROLOL TARTRATE 1 MG/ML
1-2 INJECTION, SOLUTION INTRAVENOUS EVERY 5 MIN PRN
Status: DISCONTINUED | OUTPATIENT
Start: 2020-07-23 | End: 2020-07-23 | Stop reason: HOSPADM

## 2020-07-23 RX ORDER — KETOROLAC TROMETHAMINE 30 MG/ML
30 INJECTION, SOLUTION INTRAMUSCULAR; INTRAVENOUS EVERY 6 HOURS PRN
Status: DISCONTINUED | OUTPATIENT
Start: 2020-07-23 | End: 2020-07-23 | Stop reason: HOSPADM

## 2020-07-23 RX ORDER — AMLODIPINE BESYLATE 5 MG/1
5 TABLET ORAL DAILY
Status: DISCONTINUED | OUTPATIENT
Start: 2020-07-23 | End: 2020-07-24 | Stop reason: HOSPADM

## 2020-07-23 RX ORDER — BUPIVACAINE HYDROCHLORIDE AND EPINEPHRINE 5; 5 MG/ML; UG/ML
INJECTION, SOLUTION PERINEURAL PRN
Status: DISCONTINUED | OUTPATIENT
Start: 2020-07-23 | End: 2020-07-23 | Stop reason: HOSPADM

## 2020-07-23 RX ORDER — ACETAMINOPHEN 325 MG/1
650 TABLET ORAL EVERY 4 HOURS PRN
Status: DISCONTINUED | OUTPATIENT
Start: 2020-07-26 | End: 2020-07-24 | Stop reason: HOSPADM

## 2020-07-23 RX ORDER — ONDANSETRON 2 MG/ML
4 INJECTION INTRAMUSCULAR; INTRAVENOUS EVERY 30 MIN PRN
Status: DISCONTINUED | OUTPATIENT
Start: 2020-07-23 | End: 2020-07-23 | Stop reason: HOSPADM

## 2020-07-23 RX ORDER — ONDANSETRON 2 MG/ML
INJECTION INTRAMUSCULAR; INTRAVENOUS PRN
Status: DISCONTINUED | OUTPATIENT
Start: 2020-07-23 | End: 2020-07-23

## 2020-07-23 RX ORDER — NITROGLYCERIN 0.4 MG/1
0.4 TABLET SUBLINGUAL EVERY 5 MIN PRN
Status: DISCONTINUED | OUTPATIENT
Start: 2020-07-23 | End: 2020-07-24 | Stop reason: HOSPADM

## 2020-07-23 RX ORDER — FENTANYL CITRATE 50 UG/ML
INJECTION, SOLUTION INTRAMUSCULAR; INTRAVENOUS PRN
Status: DISCONTINUED | OUTPATIENT
Start: 2020-07-23 | End: 2020-07-23

## 2020-07-23 RX ORDER — DEXTROSE MONOHYDRATE, SODIUM CHLORIDE, AND POTASSIUM CHLORIDE 50; 1.49; 4.5 G/1000ML; G/1000ML; G/1000ML
INJECTION, SOLUTION INTRAVENOUS CONTINUOUS
Status: DISCONTINUED | OUTPATIENT
Start: 2020-07-23 | End: 2020-07-24 | Stop reason: HOSPADM

## 2020-07-23 RX ORDER — AMLODIPINE BESYLATE 5 MG/1
5 TABLET ORAL DAILY
Qty: 30 TABLET | Refills: 0 | Status: SHIPPED | OUTPATIENT
Start: 2020-07-25 | End: 2020-07-24

## 2020-07-23 RX ORDER — ASPIRIN 325 MG
325 TABLET ORAL DAILY
Status: DISCONTINUED | OUTPATIENT
Start: 2020-07-23 | End: 2020-07-23

## 2020-07-23 RX ORDER — CEFAZOLIN SODIUM 2 G/100ML
2 INJECTION, SOLUTION INTRAVENOUS
Status: COMPLETED | OUTPATIENT
Start: 2020-07-23 | End: 2020-07-23

## 2020-07-23 RX ORDER — FENTANYL CITRATE 50 UG/ML
25-50 INJECTION, SOLUTION INTRAMUSCULAR; INTRAVENOUS EVERY 5 MIN PRN
Status: DISCONTINUED | OUTPATIENT
Start: 2020-07-23 | End: 2020-07-23 | Stop reason: HOSPADM

## 2020-07-23 RX ORDER — GLYCOPYRROLATE 0.2 MG/ML
INJECTION, SOLUTION INTRAMUSCULAR; INTRAVENOUS PRN
Status: DISCONTINUED | OUTPATIENT
Start: 2020-07-23 | End: 2020-07-23

## 2020-07-23 RX ORDER — OXYCODONE HYDROCHLORIDE 5 MG/1
5 TABLET ORAL EVERY 4 HOURS PRN
Status: DISCONTINUED | OUTPATIENT
Start: 2020-07-23 | End: 2020-07-23

## 2020-07-23 RX ORDER — CEFAZOLIN SODIUM 1 G/3ML
1 INJECTION, POWDER, FOR SOLUTION INTRAMUSCULAR; INTRAVENOUS SEE ADMIN INSTRUCTIONS
Status: DISCONTINUED | OUTPATIENT
Start: 2020-07-23 | End: 2020-07-23 | Stop reason: HOSPADM

## 2020-07-23 RX ORDER — LIDOCAINE HYDROCHLORIDE 10 MG/ML
INJECTION, SOLUTION INFILTRATION; PERINEURAL PRN
Status: DISCONTINUED | OUTPATIENT
Start: 2020-07-23 | End: 2020-07-23

## 2020-07-23 RX ORDER — FENTANYL CITRATE 50 UG/ML
25-50 INJECTION, SOLUTION INTRAMUSCULAR; INTRAVENOUS
Status: DISCONTINUED | OUTPATIENT
Start: 2020-07-23 | End: 2020-07-23 | Stop reason: HOSPADM

## 2020-07-23 RX ORDER — HYDRALAZINE HYDROCHLORIDE 20 MG/ML
10-20 INJECTION INTRAMUSCULAR; INTRAVENOUS EVERY 4 HOURS PRN
Status: DISCONTINUED | OUTPATIENT
Start: 2020-07-23 | End: 2020-07-23

## 2020-07-23 RX ORDER — ACETAMINOPHEN 325 MG/1
975 TABLET ORAL EVERY 8 HOURS
Status: DISCONTINUED | OUTPATIENT
Start: 2020-07-23 | End: 2020-07-24 | Stop reason: HOSPADM

## 2020-07-23 RX ORDER — LABETALOL HYDROCHLORIDE 5 MG/ML
INJECTION, SOLUTION INTRAVENOUS PRN
Status: DISCONTINUED | OUTPATIENT
Start: 2020-07-23 | End: 2020-07-23

## 2020-07-23 RX ORDER — DEXAMETHASONE SODIUM PHOSPHATE 4 MG/ML
INJECTION, SOLUTION INTRA-ARTICULAR; INTRALESIONAL; INTRAMUSCULAR; INTRAVENOUS; SOFT TISSUE PRN
Status: DISCONTINUED | OUTPATIENT
Start: 2020-07-23 | End: 2020-07-23

## 2020-07-23 RX ORDER — MEPERIDINE HYDROCHLORIDE 25 MG/ML
12.5 INJECTION INTRAMUSCULAR; INTRAVENOUS; SUBCUTANEOUS
Status: DISCONTINUED | OUTPATIENT
Start: 2020-07-23 | End: 2020-07-23 | Stop reason: HOSPADM

## 2020-07-23 RX ORDER — NEOSTIGMINE METHYLSULFATE 1 MG/ML
VIAL (ML) INJECTION PRN
Status: DISCONTINUED | OUTPATIENT
Start: 2020-07-23 | End: 2020-07-23

## 2020-07-23 RX ADMIN — FENTANYL CITRATE 100 MCG: 50 INJECTION, SOLUTION INTRAMUSCULAR; INTRAVENOUS at 16:24

## 2020-07-23 RX ADMIN — ONDANSETRON HYDROCHLORIDE 4 MG: 2 INJECTION, SOLUTION INTRAVENOUS at 16:45

## 2020-07-23 RX ADMIN — HYDRALAZINE HYDROCHLORIDE 20 MG: 20 INJECTION INTRAMUSCULAR; INTRAVENOUS at 00:14

## 2020-07-23 RX ADMIN — ONDANSETRON 4 MG: 2 INJECTION INTRAMUSCULAR; INTRAVENOUS at 11:16

## 2020-07-23 RX ADMIN — HYDROMORPHONE HYDROCHLORIDE 0.5 MG: 1 INJECTION, SOLUTION INTRAMUSCULAR; INTRAVENOUS; SUBCUTANEOUS at 16:44

## 2020-07-23 RX ADMIN — Medication 3 MG: at 17:30

## 2020-07-23 RX ADMIN — NITROGLYCERIN 0.4 MG: 0.4 TABLET SUBLINGUAL at 00:22

## 2020-07-23 RX ADMIN — FENTANYL CITRATE 50 MCG: 50 INJECTION, SOLUTION INTRAMUSCULAR; INTRAVENOUS at 18:08

## 2020-07-23 RX ADMIN — DEXAMETHASONE SODIUM PHOSPHATE 4 MG: 4 INJECTION, SOLUTION INTRA-ARTICULAR; INTRALESIONAL; INTRAMUSCULAR; INTRAVENOUS; SOFT TISSUE at 16:46

## 2020-07-23 RX ADMIN — GLYCOPYRROLATE 0.6 MG: 0.2 INJECTION, SOLUTION INTRAMUSCULAR; INTRAVENOUS at 17:30

## 2020-07-23 RX ADMIN — NITROGLYCERIN 0.4 MG: 0.4 TABLET SUBLINGUAL at 00:43

## 2020-07-23 RX ADMIN — MIDAZOLAM 1 MG: 1 INJECTION INTRAMUSCULAR; INTRAVENOUS at 14:26

## 2020-07-23 RX ADMIN — SODIUM CHLORIDE, POTASSIUM CHLORIDE, SODIUM LACTATE AND CALCIUM CHLORIDE: 600; 310; 30; 20 INJECTION, SOLUTION INTRAVENOUS at 16:14

## 2020-07-23 RX ADMIN — LIDOCAINE HYDROCHLORIDE 50 MG: 10 INJECTION, SOLUTION INFILTRATION; PERINEURAL at 16:24

## 2020-07-23 RX ADMIN — Medication 5 MG: at 01:08

## 2020-07-23 RX ADMIN — PROPOFOL 60 MCG/KG/MIN: 10 INJECTION, EMULSION INTRAVENOUS at 16:37

## 2020-07-23 RX ADMIN — KETOROLAC TROMETHAMINE 30 MG: 30 INJECTION, SOLUTION INTRAMUSCULAR at 18:09

## 2020-07-23 RX ADMIN — PROCHLORPERAZINE EDISYLATE 10 MG: 5 INJECTION INTRAMUSCULAR; INTRAVENOUS at 11:53

## 2020-07-23 RX ADMIN — ACETAMINOPHEN 975 MG: 325 TABLET, FILM COATED ORAL at 22:20

## 2020-07-23 RX ADMIN — PROPOFOL 150 MG: 10 INJECTION, EMULSION INTRAVENOUS at 16:28

## 2020-07-23 RX ADMIN — ACETAMINOPHEN 650 MG: 325 TABLET, FILM COATED ORAL at 04:29

## 2020-07-23 RX ADMIN — HUMAN ALBUMIN MICROSPHERES AND PERFLUTREN 3 ML: 10; .22 INJECTION, SOLUTION INTRAVENOUS at 09:42

## 2020-07-23 RX ADMIN — BUPIVACAINE HYDROCHLORIDE AND EPINEPHRINE BITARTRATE 13 ML: 5; .005 INJECTION, SOLUTION EPIDURAL; INTRACAUDAL; PERINEURAL at 17:27

## 2020-07-23 RX ADMIN — SODIUM CHLORIDE 40 ML GIVEN: 900 IRRIGANT IRRIGATION at 17:17

## 2020-07-23 RX ADMIN — FENTANYL CITRATE 50 MCG: 50 INJECTION, SOLUTION INTRAMUSCULAR; INTRAVENOUS at 18:23

## 2020-07-23 RX ADMIN — POTASSIUM CHLORIDE, DEXTROSE MONOHYDRATE AND SODIUM CHLORIDE: 150; 5; 450 INJECTION, SOLUTION INTRAVENOUS at 20:02

## 2020-07-23 RX ADMIN — FENTANYL CITRATE 50 MCG: 50 INJECTION, SOLUTION INTRAMUSCULAR; INTRAVENOUS at 17:51

## 2020-07-23 RX ADMIN — PROPOFOL 200 MG: 10 INJECTION, EMULSION INTRAVENOUS at 16:24

## 2020-07-23 RX ADMIN — SODIUM CHLORIDE, POTASSIUM CHLORIDE, SODIUM LACTATE AND CALCIUM CHLORIDE: 600; 310; 30; 20 INJECTION, SOLUTION INTRAVENOUS at 16:50

## 2020-07-23 RX ADMIN — CEFAZOLIN SODIUM 2 G: 2 INJECTION, SOLUTION INTRAVENOUS at 16:16

## 2020-07-23 RX ADMIN — Medication 100 MG: at 16:24

## 2020-07-23 RX ADMIN — ONDANSETRON 4 MG: 4 TABLET, ORALLY DISINTEGRATING ORAL at 01:32

## 2020-07-23 RX ADMIN — LABETALOL HYDROCHLORIDE 7.5 MG: 5 INJECTION INTRAVENOUS at 17:05

## 2020-07-23 RX ADMIN — MIDAZOLAM 2 MG: 1 INJECTION INTRAMUSCULAR; INTRAVENOUS at 16:15

## 2020-07-23 RX ADMIN — ACETAMINOPHEN 650 MG: 325 TABLET, FILM COATED ORAL at 11:57

## 2020-07-23 NOTE — PROGRESS NOTES
[facility]    Hospitalist Progress Note      Assessment & Plan   Nan Stinson is a 54 year old female who was admitted on 7/22/2020 for evaluation of episodic acute upper abdominal pain over the past 3-4 previous days with associated nausea concerning for biliary colic.     #Cholelithiasis  #Biliary Colic  #Transamanitis   Pain controlled.  No further nausea or emesis this a.m., afebrile and without leukocytosis. Ultrasound confirmed the presence of gallstones but without evidence of acute cholecystitis. Hepatocellular enzymes trending slightly upward ( <- 243), ( <- 415), (Alk 183 <- 161), T bili and Lipase WNL.  Seen in consultation by general surgery and offered laparoscopic cholecystectomy.  If she is stable following the procedure will discharge to home later today.     #Hypertensive Urgency  Blood pressures elevated upon returning to the floor from the ED, up to 199/107 in the setting of nausea, uncontrolled pain and anxiety.  She received several doses of IV hydralazine and this was not well tolerated.  Started on amlodipine with improvement in pressures.  There was not evidence of acute endorgan dysfunction or KAMLA on labs. She has history of elevated blood pressure but no formal hypertension diagnosis and not on oral antihypertensive prior to admission.   -At discharge should be instructed on routine blood pressure monitoring with close follow-up with primary care  -Amlodipine was started, 5 mg daily    #Chest Pain   Episode of chest pressure the setting of nausea and vomiting, pretense of emergency.  Chest discomfort resolved.  Low clinical suspicion for ACS.  ECG changes are not dynamic from prior, nonspecific t changes.  Troponins undetectable.  Telemetry unrevealing.  No recurrence of chest pressure.  TTE updated 7/23 showed preserved ejection fraction, without wall motion abnormalities.    In terms of cardiac surgical risk the patient can complete greater than 4  "METS without exertional dyspnea or angina.  She has no cardiac history.  She could consider outpatient stress evaluation in the future.        DVT Prophylaxis: Ambulate every shift  Code Status: Full Code  Expected discharge: Today, recommended to prior living arrangement once Clinically stable following surgery..    Joan Perdomo PA-C  [employer] Page (7am - 5pm, M-F)    Interval History   Patient has had no recurrence of abdominal pain.  No nausea or vomiting.  After receiving hydralazine overnight the patient complained of feeling \"off:\" at which point she developed more nausea and vomiting and developed chest pressure with diffuse paresthesias.  She felt that this was related to her anxiety on further interview. It dissolved after about an hour. Telemetry was unrevealing. Chest pressure was not associated with dyspnea and did not recur.     -Data reviewed today: I reviewed all new labs and imaging results over the last 24 hours.     Physical Exam   Temp: 96.1  F (35.6  C) Temp src: Oral BP: 136/75 Pulse: 73 Heart Rate: 73 Resp: 16 SpO2: 96 % O2 Device: None (Room air)    Vitals:    07/22/20 1253 07/22/20 1647 07/22/20 1648   Weight: 113.4 kg (250 lb) 118.5 kg (261 lb 4.8 oz) 118.5 kg (261 lb 4.8 oz)     Vital Signs with Ranges  Temp:  [96.1  F (35.6  C)-98.6  F (37  C)] 96.1  F (35.6  C)  Pulse:  [64-75] 73  Heart Rate:  [58-90] 73  Resp:  [14-20] 16  BP: (136-199)/() 136/75  SpO2:  [96 %-100 %] 96 %  No intake/output data recorded.      Constitutional:Awake, alert, no apparent distress  Respiratory:  Normal work of breathing. Lungs clear to auscultation bilaterally, no crackles or wheezing.  Cardiovascular: Regular rate and rhythm, normal S1 and S2, and no murmur appreciated.   GI: Normal bowel sounds, soft, non-distended, tender to deep palpation of right upper quadrant.  Skin/Integument: No rashes, no cyanosis, no peripheral edema.  Neuro: Moving all extremities with normal strength. Coordination and " sensation grossly intact. Speech clear. No focal deficits.   Psych: Appropriate affect.            Medications     lactated ringers Stopped (07/23/20 0110)       amLODIPine  5 mg Oral Daily     aspirin  325 mg Oral Daily       Data   Recent Labs   Lab 07/23/20  0556 07/23/20  0148 07/22/20  1309   WBC  --   --  9.9   HGB  --   --  13.7   MCV  --   --  94   PLT  --   --  235     --  139   POTASSIUM 4.2  --  3.9   CHLORIDE 107  --  107   CO2 29  --  28   BUN 7  --  8   CR 0.72  --  0.70   ANIONGAP 4  --  4   WALTER 8.6  --  9.5   *  --  121*   ALBUMIN 3.4  --  3.7   PROTTOTAL 6.7*  --  7.2   BILITOTAL 0.9  --  0.9   ALKPHOS 183*  --  161*   *  --  243*   *  --  415*   LIPASE  --   --  129   TROPI <0.015 <0.015 <0.015       Recent Results (from the past 24 hour(s))   US Abdomen Limited    Narrative    ULTRASOUND ABDOMEN LIMITED 7/22/2020 2:30 PM    CLINICAL HISTORY: Right upper quadrant pain. Gallstones, right upper  quadrant.    TECHNIQUE: Limited abdominal ultrasound.    COMPARISON: None.    FINDINGS:    GALLBLADDER: Negative sonographic Morales sign. Cholelithiasis. A  gallstone is noted at the gallbladder neck. Distended gallbladder. No  gallbladder wall thickening.    BILE DUCTS: There is no biliary dilatation. The common duct measures  4mm.    LIVER: Fatty liver. No focal liver lesion identified. Liver is 17.5  cm.    RIGHT KIDNEY: No hydronephrosis.    PANCREAS: The visualized portions of the pancreas are normal.    No ascites.      Impression    IMPRESSION:  1.  Negative sonographic Morales sign. However, there is cholelithiasis  with a gallstone seen at the gallbladder neck and there is distention  of the gallbladder. No biliary dilatation.  2.  Fatty liver. Mildly enlarged liver.    TRENT QUIROZ MD   Echocardiogram Complete    Narrative    [id number]  [id number]  [id number]  [id number]^CHASTITY^MIQUEL^R           [facility]  Echocardiography Laboratory  [address] [place] [address]        Name: MARK ARELLANO  MRN: [id number]  : 1965  Study Date: 2020 09:07 AM  Age: 54 yrs  Gender: Female  Patient Location: [facility]  Reason For Study: Chest Pain  Ordering Physician: MIQUEL HAYWOOD  Performed By: Peyton Khan     BSA: 2.2 m2  Height: 65 in  Weight: 261 lb  HR: 60  BP: 161/71 mmHg  _____________________________________________________________________________  __        Procedure  Complete Portable Echo Adult. Optison (NDC #[id number]) given intravenously.  Technically difficult study.  _____________________________________________________________________________  __        Interpretation Summary     Left ventricular systolic function is normal.  The visual ejection fraction is estimated at 55-60%.  The study was technically difficult. There is no comparison study available.  _____________________________________________________________________________  __        Left Ventricle  The left ventricle is normal in size. There is normal left ventricular wall  thickness. Left ventricular systolic function is normal. The visual ejection  fraction is estimated at 55-60%. Left ventricular diastolic function is  indeterminate. No regional wall motion abnormalities noted.     Right Ventricle  The right ventricle is normal size. The right ventricular systolic function is  normal.     Atria  Normal left atrial size. Right atrial size is normal.     Mitral Valve  There is mild mitral annular calcification. There is trace mitral  regurgitation.        Tricuspid Valve  The tricuspid valve is not well visualized. No tricuspid regurgitation. Right  ventricular systolic pressure could not be approximated due to inadequate  tricuspid regurgitation. IVC diameter >2.1 cm collapsing <50% with sniff  suggests a high RA pressure estimated at 15 mmHg or greater.     Aortic Valve  The aortic valve is trileaflet. No aortic regurgitation is present. No aortic  stenosis is present.     Pulmonic  Valve  The pulmonic valve is not well visualized.     Vessels  The aortic root is normal size.     Pericardium  There is no pericardial effusion.        Rhythm  Sinus rhythm was noted.  _____________________________________________________________________________  __  MMode/2D Measurements & Calculations     IVSd: 1.0 cm  LVIDd: 4.4 cm  LVIDs: 2.9 cm  LVPWd: 0.88 cm  FS: 33.9 %  LV mass(C)d: 138.2 grams  LV mass(C)dI: 62.4 grams/m2  Ao root diam: 3.1 cm  LA dimension: 3.8 cm  asc Aorta Diam: 3.2 cm  LA/Ao: 1.2  LVOT diam: 2.0 cm  LVOT area: 3.2 cm2  LA Volume (BP): 54.0 ml  LA Volume Index (BP): 24.3 ml/m2  RWT: 0.40           Doppler Measurements & Calculations  MV E max kim: 108.6 cm/sec  MV A max kim: 83.4 cm/sec  MV E/A: 1.3  MV dec time: 0.18 sec  LV V1 max P.0 mmHg  LV V1 max: 112.3 cm/sec  LV V1 VTI: 29.1 cm  SV(LVOT): 93.8 ml  SI(LVOT): 42.3 ml/m2  PA acc time: 0.12 sec  E/E' av.7  Lateral E/e': 9.2  Medial E/e': 14.3              _____________________________________________________________________________  __        Report approved by: Nate Richardson 2020 10:14 AM

## 2020-07-23 NOTE — ANESTHESIA CARE TRANSFER NOTE
Patient: Nan Stinson    Procedure(s):  CHOLECYSTECTOMY, LAPAROSCOPIC, WITH CHOLANGIOGRAM    Diagnosis: Gall stones [K80.20]  Diagnosis Additional Information: No value filed.    Anesthesia Type:   General     Note:  Airway :Face Mask  Patient transferred to:PACU  Comments: To PACU, Report to RN, oxygen per face mask.      Vitals: (Last set prior to Anesthesia Care Transfer)    CRNA VITALS  7/23/2020 1731 - 7/23/2020 1804      7/23/2020             Pulse:  72    SpO2:  100 %                Electronically Signed By: DAKSHA Cagle CRNA  July 23, 2020  6:04 PM

## 2020-07-23 NOTE — OR NURSING
Patient very anxious in pre-op. Dr. Rodriguez, [facility], notified. Verbal orders for 1 mg Versed IV received and administered.

## 2020-07-23 NOTE — PROVIDER NOTIFICATION
DATE:  7/23/2020   TIME OF RECEIPT FROM LAB:  7:55 AM   LAB TEST:  ALT  LAB VALUE: 562  RESULTS GIVEN WITH READ-BACK TO (PROVIDER):  GRACIELA Franco.   TIME LAB VALUE REPORTED TO PROVIDER:   8:05 AM

## 2020-07-23 NOTE — OP NOTE
General Surgery Operative Note    Pre-operative diagnosis:  Gallstones [K80.20]   Post-operative diagnosis: same   Procedure: Laparoscopic Cholecystectomy   Surgeon: Gio Stanton MD   Assistant(s): Alena Kilgore PA-C - the physician assistant was medically necessary to assist in prepping, positioning, camera operation, retraction/exposure and closure of the port site.    Anesthesia: General    Estimated blood loss: 5 cc's   Drains placed: None   Complications:  None   Findings:   Gallbladder with hydrops.  Intraoperative cholangiogram showed a prominent common bile duct, but no obvious filling defect or obstruction.     INDICATIONS FOR OPERATION: This is a patient with upper abdominal pain, gallstones and mildly elevated liver function tests.  Laparoscopic cholecystectomy with cholangiogram was recommended.  The procedure along with its risks and complications was discussed in detail and the patient agreed to proceed.    DETAILS OF THE OPERATION: After informed consent the patient was taken to the operating room where she underwent satisfactory induction of general anesthesia.  The patient was then sterilely prepped and draped.  A supraumbilical skin incision was made using a skin knife.  The dissection was carried bluntly down to the fascia.  The fascia was opened using electrocautery and the De La Rosa trocar was then inserted.  Pneumoperitoneum was achieved using CO2 insufflation, and under direct visualization  three  5 mm upper abdominal ports were placed.  The gallbladder was visualized and was grasped.  It was tensely distended, and therefore we aspirated the gallbladder.  This revealed about 150 cc of almost clear fluid.  This is felt to be consistent with hydrops of the gallbladder.  The dome of the gallbladder was pulled up over the liver and the cystic duct was exposed.  The cystic duct was then skeletonized, and a single clip was placed on the gallbladder end.  A cholangiogram catheter was inserted  into the abdomen through 12-gauge Angiocath, and was then threaded into the cystic duct and clipped in place.  C-arm cholangiogram was performed.  There was no evidence of common duct stones.  The cholangiogram catheter was now removed and the cystic duct was double clipped on the common duct end and divided.  The cystic artery was now skeletonized, triple clipped and divided.  The posterior branch was also clipped. The gallbladder was then dissected away from the liver using electrocautery.  The gallbladder was then placed in an Endo Catch bag and removed through the supraumbilical incision.  The gallbladder fossa was irrigated out.  There was excellent hemostasis and the clips were in good position.  The trocar sites were now infiltrated with half percent Marcaine with epinephrine.  The trochars were removed under direct visualization.  The supraumbilical fascia was then closed using 0 Vicryl suture.  Skin incisions were closed using 4-0 subcuticular Vicryl followed by Steri-Strips.    The patient was transferred to the recovery room in satisfactory condition.  Sponge and needle counts were correct at the close of the case.    Specimens:   ID Type Source Tests Collected by Time Destination   A : GALLBLADDER AND CONTENTS Tissue Gallbladder and Contents SURGICAL PATHOLOGY EXAM Gio Stanton MD 7/23/2020  5:22 PM            Gio Stanton MD

## 2020-07-23 NOTE — CONSULTS
Chief complaint:  Abdominal pain, epigastric    HPI:  This patient is a 54 year old female who presents with about 2 weeks of intermittent upper abdominal pain which has become more constant over the last 3 to 4 days.  Patient had a similar episode noted to be biliary colic about 10 years ago.  She successfully treated that with homeopathic treatment.  She feels better than she did on admission, but still feels quite nauseated.  She has had some hypertension overnight.    Past Medical History:  Cholelithiasis  Pre-hypertension  Unspecified arrhythmia, details unclear  Sleep apnea-uses CPAP.    Past Surgical History:  No previous surgeries.    Social History:  Social History     Socioeconomic History     Marital status:      Spouse name: Not on file     Number of children: Not on file     Years of education: Not on file     Highest education level: Not on file   Occupational History     Not on file   Social Needs     Financial resource strain: Not on file     Food insecurity     Worry: Not on file     Inability: Not on file     Transportation needs     Medical: Not on file     Non-medical: Not on file   Tobacco Use     Smoking status: Not on file   Substance and Sexual Activity     Alcohol use: Not on file     Drug use: Not on file     Sexual activity: Not on file   Lifestyle     Physical activity     Days per week: Not on file     Minutes per session: Not on file     Stress: Not on file   Relationships     Social connections     Talks on phone: Not on file     Gets together: Not on file     Attends [employer] service: Not on file     Active member of club or organization: Not on file     Attends meetings of clubs or organizations: Not on file     Relationship status: Not on file     Intimate partner violence     Fear of current or ex partner: Not on file     Emotionally abused: Not on file     Physically abused: Not on file     Forced sexual activity: Not on file   Other Topics Concern     Not on file   Social  History Narrative     Not on file        Family History:  No family history on file.    Review of Systems:  The 10 point Review of Systems is negative other than noted in the HPI and above.    Physical Exam:  General - This is a well developed, well nourished female in no apparent distress.  HEENT - Normocephalic, atraumatic.  No scleral icterus.  Neck - supple without masses  Lungs - clear to ascultation.    Heart - Regular rate & rhythm without murmur  Abdomen:   soft, non-distended with tenderness noted in the right upper quadrant . no masses palpated. normal bowel sounds.  Extremities - warm without edema  Neurologic - nonfocal    Relevant labs:  Labs today reveal a total bilirubin of 0.9, alkaline phosphatase of 183, ALT of 562 and AST of 481.  The transaminases are mildly higher than yesterday.  The patient had a normal lipase of 129 yesterday.  White blood cell count on admission was 9900.    Imaging:  Ultrasound reveals a distended gallbladder.  A gallstone is noted at the gallbladder neck.  There is no gallbladder wall thickening or ductal dilatation noted.    Assessment and Plan:  This is a patient with a significant amount of discomfort over the last couple of weeks, likely due to her gallbladder.  I have recommended Laparoscopic cholecystectomy with cholangiograms.  Discussed procedure, risks and complications.  We will plan on surgery later today.  The patient has been having some issues with hypertension and does have sleep apnea.  For that reason, she will come back to the floor postoperatively.    Gio Stanton MD  [facility]

## 2020-07-23 NOTE — PLAN OF CARE
PRIMARY DIAGNOSIS: Cholelithiasis w/ Transaminitis  OUTPATIENT/OBSERVATION GOALS TO BE MET BEFORE DISCHARGE:    1. Pain status: Improved but still requiring IV narcotics.  2. Stable vital signs and labs (if performed) at disposition: Yes. Hypertensive overnight, last /79 at 11:13 AM.   3. Tolerating adequate PO diet: No. Remains NPO. Intermittent nausea. Given IV Zofran and Compazine.   4. Successful cholecystectomy or clear follow up plan with General Surgery team if immediate surgery not performed: Yes.   5. ADLs back to baseline?  Yes  6. Activity and level of assistance: Ambulating independently.  7. Barriers to discharge noted: No    Discharge Planner Nurse   Safe discharge environment identified: Yes  Barriers to discharge: Yes       Entered by: Mitali Goodwin 07/23/2020 12:00 PM    A&Ox4. RA. Hypertensive, VSS stable otherwise. Patient was also hypertensive overnight, BP was 136/75 this AM. Denies chest pain/pressure this AM. Up independently. RA. Denies SOB. Intermittent nausea, declines interventions at this time. Continues remote tele. Rates abdominal pain 2/10 this AM, declined medications. BS active x4, passing flatus, remains NPO. Voiding in adequate amt's. SL. Continues serial troponin's, neg x2. ECHO completed. General Surgery consulted. Will continue to monitor.     Please review provider order for any additional goals.   Nurse to notify provider when observation goals have been met and patient is ready for discharge.

## 2020-07-23 NOTE — PLAN OF CARE
PRIMARY DIAGNOSIS: Cholelithiasis   OUTPATIENT/OBSERVATION GOALS TO BE MET BEFORE DISCHARGE:  1. Pain Status: Improved but still requiring IV narcotics.    2. Return to near baseline physical activity: Yes    3. Cleared for discharge by consultants (if involved): No; waiting gen surg consults     Discharge Planner Nurse   Safe discharge environment identified: Yes  Barriers to discharge:Yes; Gen surg consult        Entered by: Alanna Jack 07/22/2020 11:13 PM     Please review provider order for any additional goals.   Nurse to notify provider when observation goals have been met and patient is ready for discharge.

## 2020-07-23 NOTE — PLAN OF CARE
"PRIMARY DIAGNOSIS: Cholelithiasis w/ Transaminitis  OUTPATIENT/OBSERVATION GOALS TO BE MET BEFORE DISCHARGE:    1. Pain status: Improved but still requiring IV narcotics.  2. Stable vital signs and labs (if performed) at disposition: Yes. Hypertensive overnight, BP stable this AM.   3. Tolerating adequate PO diet: No. NPO.   4. Successful cholecystectomy or clear follow up plan with General Surgery team if immediate surgery not performed: General surgery to consult   5. ADLs back to baseline?  Yes  6. Activity and level of assistance: Ambulating independently.  7. Barriers to discharge noted No    Discharge Planner Nurse   Safe discharge environment identified: Yes  Barriers to discharge: Yes       Entered by: Mitali Goodwin 07/23/2020 8:00 AM     /75 (BP Location: Left arm)   Pulse 73   Temp 96.1  F (35.6  C) (Oral)   Resp 16   Ht 1.651 m (5' 5\")   Wt 118.5 kg (261 lb 4.8 oz)   SpO2 96%   BMI 43.48 kg/m      A&Ox4. VSS this AM. Patient was hypertensive overnight, BP was 136/75 this AM. Denies chest pain/pressure this AM. Up independently. RA. Denies SOB. Intermittent nausea, declines interventions at this time. Continues remote tele. Rates abdominal pain 2/10 this AM, declined medications. BS active x4, passing flatus, remains NPO. Voiding in adequate amt's. SL. Continues serial troponin's, neg x2. ECHO in process. General Surgery consulted, possible surgery today. Will continue to monitor.     Please review provider order for any additional goals.   Nurse to notify provider when observation goals have been met and patient is ready for discharge.    "

## 2020-07-23 NOTE — PROGRESS NOTES
"PRIMARY DIAGNOSIS: ACUTE PAIN/Cholelithiasis  OUTPATIENT/OBSERVATION GOALS TO BE MET BEFORE DISCHARGE:  1. Pain Status: Improved but still requiring IV narcotics.    2. Return to near baseline physical activity: Yes    3. Cleared for discharge by consultants (if involved): No    Discharge Planner Nurse   Safe discharge environment identified: Yes  Barriers to discharge: Yes       Entered by: Alanna Jack 07/23/2020 1:44 AM     Please review provider order for any additional goals.   Nurse to notify provider when observation goals have been met and patient is ready for discharge.    Pt c/o new mid-sternal chest pain/\"tightness\" around 0100; MD notified of unmanaged hypertension and new chest pain. STAT EKG done with abnormal results. Nitroglycerin sublingual given 2x. Telemetry placed on patient. Serial Troponin to be drawn. Hospitalist assessed pt in person. Appears calm, not in distress, not diaphoretic. 1 prn given for nausea.   "

## 2020-07-23 NOTE — PLAN OF CARE
PRIMARY DIAGNOSIS: ACUTE PAIN/Cholelithiasis   OUTPATIENT/OBSERVATION GOALS TO BE MET BEFORE DISCHARGE:  1. Pain Status: Improved but still requiring IV narcotics.    2. Return to near baseline physical activity: Yes    3. Cleared for discharge by consultants (if involved): No; Gen Surg consult today.     Discharge Planner Nurse   Safe discharge environment identified: Yes  Barriers to discharge: Yes; Gen Surg consults, new chest pain/hypertension       Entered by: Alanna Jack 07/23/2020 4:15 AM     Please review provider order for any additional goals.   Nurse to notify provider when observation goals have been met and patient is ready for discharge.    Pt was given PRN Zofran with continued nausea; refused prn Compazine. Utilized aromatherapy, ice chips, and prn melatonin and was able to fall asleep.   Checking frequent BPs as well as serial troponin levels. Initial draw insignificant. EKG Abnormal, hospitalist aware.

## 2020-07-23 NOTE — ANESTHESIA PREPROCEDURE EVALUATION
"Anesthesia Pre-Procedure Evaluation    Patient: Nan Stinson   MRN: [id number] : 1965          Preoperative Diagnosis: Gall stones [K80.20]    Procedure(s):  CHOLECYSTECTOMY, LAPAROSCOPIC, WITH CHOLANGIOGRAM    History reviewed. No pertinent past medical history.  History reviewed. No pertinent surgical history.  Anesthesia Evaluation     . Pt has not had prior anesthetic            ROS/MED HX    ENT/Pulmonary:  - neg pulmonary ROS     Neurologic:       Cardiovascular:  - neg cardiovascular ROS       METS/Exercise Tolerance:     Hematologic:         Musculoskeletal:         GI/Hepatic:     (+) cholecystitis/cholelithiasis,       Renal/Genitourinary:         Endo:     (+) Obesity, .      Psychiatric:         Infectious Disease:         Malignancy:         Other:                          Physical Exam      Airway   Mallampati: II  TM distance: >3 FB  Neck ROM: full    Dental     Cardiovascular   Rhythm and rate: regular and normal      Pulmonary    breath sounds clear to auscultation            Lab Results   Component Value Date    WBC 9.9 2020    HGB 13.7 2020    HCT 42.4 2020     2020     2020    POTASSIUM 4.2 2020    CHLORIDE 107 2020    CO2 29 2020    BUN 7 2020    CR 0.72 2020     (H) 2020    WALTER 8.6 2020    ALBUMIN 3.4 2020    PROTTOTAL 6.7 (L) 2020     (HH) 2020     (H) 2020    ALKPHOS 183 (H) 2020    BILITOTAL 0.9 2020    LIPASE 129 2020       Preop Vitals  BP Readings from Last 3 Encounters:   20 (!) 166/84    Pulse Readings from Last 3 Encounters:   20 68      Resp Readings from Last 3 Encounters:   20 20    SpO2 Readings from Last 3 Encounters:   20 100%      Temp Readings from Last 1 Encounters:   20 97.3  F (36.3  C) (Temporal)    Ht Readings from Last 1 Encounters:   20 1.651 m (5' 5\")      Wt Readings " "from Last 1 Encounters:   07/22/20 118.5 kg (261 lb 4.8 oz)    Estimated body mass index is 43.48 kg/m  as calculated from the following:    Height as of this encounter: 1.651 m (5' 5\").    Weight as of this encounter: 118.5 kg (261 lb 4.8 oz).       Anesthesia Plan      History & Physical Review  History and physical reviewed and following examination; no interval change.    ASA Status:  3 .    NPO Status:  > 8 hours    Plan for General with Intravenous and Propofol induction. Maintenance will be Balanced.    PONV prophylaxis:  Ondansetron (or other 5HT-3) and Dexamethasone or Solumedrol         Postoperative Care  Postoperative pain management:  IV analgesics, Oral pain medications and Multi-modal analgesia.      Consents  Anesthetic plan, risks, benefits and alternatives discussed with:  Patient..                 Barney Rodriguez MD                    .  "

## 2020-07-23 NOTE — PROGRESS NOTES
"CC    Called with report of HTN and chest pain.     Ms. Paul Stinson was admitted with pain in the right upper quadrant that is been intermittent over the preceding 3 days.  It is not necessarily associated with eating, but neither is it necessarily related to activity.    On my evaluation, Ms. Paul Stinson appeared at least mildly anxious.  The nurse had given her dose of hydralazine for very elevated blood pressure and it did not do much but the patient did complain of feeling \"a little bit odd\" after that.  She then complained of some central chest pressure that did not radiate and was not associated with any shortness of breath or vomiting, but it did improve with nitroglycerin.    Examination is essentially negative and especially notable for the lack of tenderness in the right upper quadrant.  Patient's blood pressure did come down with nitroglycerin doses.  EKG does show some flipped T's in the inferior leads.  But troponins are negative.    A/P: Ms Paul Stinson is a 54-year-old, moderately obese woman with borderline blood pressure at baseline but significant hypertension right now as well as untreated dyslipidemia and family history of heart disease in her father who was admitted with recurrent episodes of abdominal pain.  Ultrasound does confirm the presence of gallstones but no evidence for acute cholecystitis though it is certainly possible that the patient passed a stone or sludge.  Labs are consistent with a passed stone but also possibly with fatty hepatitis.  This does not look like acute cholecystitis.  No evidence now for choledocholithiasis based on ultrasound and the patient is pain-free.    Now with severe hypertension and complaint of chest pain at rest.  I suspect there is a component of anxiety here, but I think this takes precedence over the question about biliary colic, though I do not think this represents acute coronary syndrome.       1.  I ordered an echocardiogram for the morning.  " Check troponins x3.  I would recommend a stress test either inpatient or outpatient but in the next couple of weeks before surgery.  2.  Initiate amlodipine 5 mg p.o. daily.    KP

## 2020-07-24 VITALS
RESPIRATION RATE: 14 BRPM | SYSTOLIC BLOOD PRESSURE: 155 MMHG | DIASTOLIC BLOOD PRESSURE: 77 MMHG | TEMPERATURE: 96.1 F | WEIGHT: 261.3 LBS | HEIGHT: 65 IN | BODY MASS INDEX: 43.53 KG/M2 | OXYGEN SATURATION: 99 % | HEART RATE: 62 BPM

## 2020-07-24 LAB
ALBUMIN SERPL-MCNC: 3.1 G/DL (ref 3.4–5)
ALP SERPL-CCNC: 159 U/L (ref 40–150)
ALT SERPL W P-5'-P-CCNC: 337 U/L (ref 0–50)
AST SERPL W P-5'-P-CCNC: 94 U/L (ref 0–45)
BILIRUB DIRECT SERPL-MCNC: 0.1 MG/DL (ref 0–0.2)
BILIRUB SERPL-MCNC: 0.6 MG/DL (ref 0.2–1.3)
INTERPRETATION ECG - MUSE: NORMAL
PROT SERPL-MCNC: 6.3 G/DL (ref 6.8–8.8)

## 2020-07-24 PROCEDURE — G0378 HOSPITAL OBSERVATION PER HR: HCPCS

## 2020-07-24 PROCEDURE — 80076 HEPATIC FUNCTION PANEL: CPT | Performed by: SURGERY

## 2020-07-24 PROCEDURE — 36415 COLL VENOUS BLD VENIPUNCTURE: CPT | Performed by: SURGERY

## 2020-07-24 PROCEDURE — 25000132 ZZH RX MED GY IP 250 OP 250 PS 637: Performed by: SURGERY

## 2020-07-24 PROCEDURE — 99217 ZZC OBSERVATION CARE DISCHARGE: CPT | Performed by: PHYSICIAN ASSISTANT

## 2020-07-24 RX ORDER — OXYCODONE HYDROCHLORIDE 5 MG/1
5 TABLET ORAL EVERY 6 HOURS PRN
Qty: 10 TABLET | Refills: 0 | Status: SHIPPED | OUTPATIENT
Start: 2020-07-24

## 2020-07-24 RX ORDER — AMLODIPINE BESYLATE 5 MG/1
5 TABLET ORAL DAILY
Qty: 30 TABLET | Refills: 0 | Status: SHIPPED | OUTPATIENT
Start: 2020-07-25

## 2020-07-24 RX ADMIN — AMLODIPINE BESYLATE 5 MG: 5 TABLET ORAL at 08:02

## 2020-07-24 RX ADMIN — OXYCODONE HYDROCHLORIDE 5 MG: 5 TABLET ORAL at 04:36

## 2020-07-24 RX ADMIN — ACETAMINOPHEN 975 MG: 325 TABLET, FILM COATED ORAL at 08:02

## 2020-07-24 RX ADMIN — ACETAMINOPHEN 975 MG: 325 TABLET, FILM COATED ORAL at 12:07

## 2020-07-24 NOTE — PROGRESS NOTES
Patient SL, O2 sats 92% on RA, patient wanted capno off while home CPAP on, home CPAP in place. Cont. Pulse ox on, will continue to monitor.

## 2020-07-24 NOTE — DISCHARGE INSTRUCTIONS
HOME CARE FOLLOWING LAPAROSCOPIC CHOLECYSTECTOMY  AGUILAR Ayala, ANITA Saucedo R. O Donnell, J. Shaheen    INCISIONAL CARE:  Replace the bandage over your incisions DAILY until all drainage stops, or if more comfortable to have in place.  If present, leave the steri-strips (white paper tapes) in place for 14 days after surgery.  If Dermabond (a type of skin glue) is present, leave in place until it wears/flakes off (2-3 weeks).     BATHING:  OK to shower 48 hours after surgery.  Avoid baths for 1 week after surgery.  You may wash your hair at any time.  Gently pat your incision dry after bathing.  Do not apply lotions, creams, or ointments to incisions.    ACTIVITY:  Light Activity -- you may immediately be up and about as tolerated.  Walking is encouraged, increase as tolerated.  Driving/Light Work-- when comfortable and off narcotic pain medications.  Strenuous Work/Activity -- limit lifting to 20 pounds for 2 weeks.  Progressively increase with time.  Active Sports (running, biking, etc.) -- cautiously resume after 2 weeks.    DISCOMFORT:  Local anesthetic placed at surgery should provide relief for 4-8 hours.  Begin taking pain pills before discomfort is severe.  Take the pain medication with some food, when possible, to minimize side effects.  Intermittent use of ice packs may help during the first 1-3 weeks after surgery.  Expect gradual improvement.    Over-the-counter anti-inflammatory medications (i.e. Ibuprofen/Advil/Motrin or Naprosyn/Aleve) may be used per package instructions in addition to or while tapering off the narcotic pain medications to decrease swelling and sensitivity.  DO NOT TAKE these Anti-inflammatory medications if your primary physician has advised against doing so, or if you have acid reflux, ulcer, or bleeding disorder, or take blood-thinner medications.  Call your primary physician or the surgery office if you have medication questions.    After laparoscopic  cholecystectomy, you may have shoulder or upper back discomfort due to the gas used during surgery.  This is temporary and should resolve within 2-3 days.  Frequent short walks may help with this.  You may have decreased energy level for 1-2 weeks after surgery related to your recovery.    DIET:  Start with liquids and gradually increase diet as tolerated.  Drink plenty of fluids.  While taking pain medications, consider use of a stool softener, increase your fiber in your diet, or add a fiber supplement (like Metamucil, Citrucel) to help prevent constipation - a possible side effect of pain medications.  It is not uncommon to experience some bowel changes (loose stools or constipation) after surgery.  Your body has to adapt to you no longer having a gall bladder.  To help minimize this side effect, avoid fatty foods for 1-2 weeks after surgery.  You may then slowly increase the amount of fatty foods in your diet.      NAUSEA:  If nauseated from the anesthetic/pain meds; rest in bed, get up cautiously with assistance, and drink clear liquids (juice, tea, broth).    FOLLOW-UP AFTER SURGERY:  -Our office will contact you approximately 2-3 weeks after surgery to check on your progress and answer any questions you may have.  If you are doing well, you will not need to return for an office appointment.  If any concerns are identified over the phone, we will help you make an appointment to see a provider.    -If you have not received a phone call, have any questions or concerns, or would like to be seen, please call us at [phone].  We are located at: [address], [address]; [place] [address]    -CONTACT US IF THE FOLLOWING DEVELOPS:   1. A fever that is above 101     2. Increased redness, warmth, drainage, bleeding, or swelling.   3. Pain that is not relieved by rest/ice and your prescription.   4.  Increasing pain after 48 hours.   5. Drainage that is thick, cloudy, yellow, green or white.   6. Any other  questions or concerns.      FREQUENTLY ASKED QUESTIONS:    Q:  How should my incision look?    A:  Normally your incision will appear slightly swollen with light redness directly along the incision itself as it heals.  It may feel like a bump or ridge as the healing/scarring happens, and over time (3-4 months) this bump or ridge feeling should slowly go away.  In general, clear or pink watery drainage can be normal at first as your incision heals, but should decrease over time.    Q:  How do I know if my incision is infected?  A:  Look at your incision for signs of infection, like redness around the incision spreading to surrounding skin, or drainage of cloudy or foul-smelling drainage.  If you feel warm, check your temperature to see if you are running a fever.    **If any of these things occur, please notify the nurse at our office.  We may need you to come into the office for an incision check.      Q:  How do I take care of my incision?  A:  If you have a dressing in place - Starting the day after surgery, replace the dressing 1-2 times a day until there is no further drainage from the incision.  At that time, a dressing is no longer needed.  Try to minimize tape on the skin if irritation is occurring at the tape sites.  If you have significant irritation from tape on the skin, please call the office to discuss other method of dressing your incision.    Small pieces of tape called  steri-strips  may be present directly overlying your incision; these may be removed 10 days after surgery unless otherwise specified by your surgeon.  If these tapes start to loosen at the ends, you may trim them back until they fall off or are removed.    A:  If you had  Dermabond  tissue glue used as a dressing (this causes your incision to look shiny with a clear covering over it) - This type of dressing wears off with time and does not require more dressings over the top unless it is draining around the glue as it wears off.  Do  not apply ointments or lotions over the incisions until the glue has completely worn off.    Q:  There is a piece of tape or a sticky  lead  still on my skin.  Can I remove this?  A:  Sometimes the sticky  leads  used for monitoring during surgery or for evaluation in the emergency department are not all removed while you are in the hospital.  These sometimes have a tab or metal dot on them.  You can easily remove these on your own, like taking off a band-aid.  If there is a gel substance under the  lead , simply wipe/clean it off with a washcloth or paper towel.      Q:  What can I do to minimize constipation (very hard stools, or lack of stools)?  A:  Stay well hydrated.  Increase your dietary fiber intake or take a fiber supplement -with plenty of water.  Walk around frequently.  You may consider an over-the-counter stool-softener.  Your Pharmacist can assist you with choosing one that is stocked at your pharmacy.  Constipation is also one of the most common side effects of pain medication.  If you are using pain medication, be pro-active and try to PREVENT problems with constipation by taking the steps above BEFORE constipation becomes a problem.    Q:  What do I do if I need more pain medications?  A:  Call the office to receive refills.  Be aware that certain pain meds cannot be called into a pharmacy and actually require a paper prescription.  A change may be made in your pain med as you progress thru your recovery period or if you have side effects to certain meds.    --Pain meds are NOT refilled after 5pm on weekdays, and NOT AT ALL on the weekends, so please look ahead to prevent problems.      Q:  Why am I having a hard time sleeping now that I am at home?  A:  Many medications you receive while you are in the hospital can impact your sleep for a number of days after your surgery/hospitalization.  Decreased level of activity and naps during the day may also make sleeping at night difficult.  Try to  minimize day-time naps, and get up frequently during the day to walk around your home during your recovery time.  Sleep aides may be of some help, but are not recommended for long-term use.      Q:  I am having some back discomfort.  What should I do?  A:  This may be related to certain positioning that was required for your surgery, extended periods of time in bed, or other changes in your overall activity level.  You may try ice, heat, acetaminophen, or ibuprofen to treat this temporarily.  Note that many pain medications have acetaminophen in them and would state this on the prescription bottle.  Be sure not to exceed the maximum of 4000mg per day of acetaminophen.     **If the pain you are having does not resolve, is severe, or is a flare of back pain you have had on other occasions prior to surgery, please contact your primary physician for further recommendations or for an appointment to be examined at their office.    Q:  Why am I having headaches?  A:  Headaches can be caused by many things:  caffeine withdrawal, use of pain meds, dehydration, high blood pressure, lack of sleep, over-activity/exhaustion, flare-up of usual migraine headaches.  If you feel this is related to muscle tension (a band-like feeling around the head, or a pressure at the low-back of the head) you may try ice or heat to this area.  You may need to drink more fluids (try electrolyte drink like Gatorade), rest, or take your usual migraine medications.   **If your headaches do not resolve, worsen, are accompanied by other symptoms, or if your blood pressure is high, please call your primary physician for recommendation and/or examination.    Q:  I am unable to urinate.  What do I do?  A:  A small percentage of people can have difficulty urinating initially after surgery.  This includes being able to urinate only a very small amount at a time and feeling discomfort or pressure in the very low abdomen.  This is called  urinary retention ,  and is actually an urgent situation.  Proceed to your nearest Emergency department for evaluation (not an Urgent Care Center).  Sometimes the bladder does not work correctly after certain medications you receive during surgery, or related to certain procedures.  You may need to have a catheter placed until your bladder recovers.  When planning to go to an Emergency department, it may help to call the ER to let them know you are coming in for this problem after a surgery.  This may help you get in quicker to be evaluated.  **If you have symptoms of a urinary tract infection, please contact your primary physician for the proper evaluation and treatment.          If you have other questions, please call the office Monday thru Friday between 8am and 5pm to discuss with the nurse or physician assistant.  #[phone]    There is a surgeon ON CALL on weekday evenings and over the weekend in case of urgent need only, and may be contacted at the same number.    If you are having an emergency, call 911 or proceed to your nearest emergency department.

## 2020-07-24 NOTE — PLAN OF CARE
PRIMARY DIAGNOSIS: Lap Montserrat  OUTPATIENT/OBSERVATION GOALS TO BE MET BEFORE DISCHARGE:  1. Stable vital signs Yes  2. Tolerating diet:Yes, tolerating ice chips  3. Pain controlled with oral pain medications:  Patient recently back from OR, comfortable.  4. Positive bowel sounds:  Yes, faint  5. Voiding without difficulty:  DTV  6. Able to ambulate:  Has not gotten up yet.  7. Provider specific discharge goals met:  No    Discharge Planner Nurse   Safe discharge environment identified: Yes  Barriers to discharge: Yes       Entered by: Nathalia Tejeda 07/24/2020      Please review provider order for any additional goals.   Nurse to notify provider when observation goals have been met and patient is ready for discharge.    Patient on 2L O2,, capno stable. Patient states she is comfortable, wanting to get some rest. Patient LS clear, BS quiet, denies passing flatus. 5 lap montserrat sites intact with steri strips, CDI. Patient currently appears to be resting comfortably with PCDs on. IV fluids running, will SL as soon as patient tolerates clears.

## 2020-07-24 NOTE — PLAN OF CARE
"PRIMARY DIAGNOSIS: Lap Montserrat  OUTPATIENT/OBSERVATION GOALS TO BE MET BEFORE DISCHARGE:  1. Stable vital signs Yes, ex. HTN, now SL will continue to monitor  2. Tolerating diet:Yes, tolerated fluids and jello  3. Pain controlled with oral pain medications:  Yes, Scheduled Tylenol working well for patient.   4. Positive bowel sounds:  Yes, faint  5. Voiding without difficulty:  Yes  6. Able to ambulate:  Yes, up SBAx1  7. Provider specific discharge goals met:  No    Discharge Planner Nurse   Safe discharge environment identified: Yes  Barriers to discharge: Yes       Entered by: Nathalia Tejeda 07/24/2020      Please review provider order for any additional goals.   Nurse to notify provider when observation goals have been met and patient is ready for discharge.    Patient on RA, satting 93%, patient refusing capno in order to wear home CPAP comfortably. Patient pain being controlled with scheduled Tylenol and ice. Patient LS clear, BS quiet, denies passing flatus. 5 lap montserrat sites intact with steri strips having minimal amount of bloody drainage. Patient currently appears to be resting comfortably with PCDs on.     BP (!) 163/76 (BP Location: Right arm)   Pulse 76   Temp 97.8  F (36.6  C) (Oral)   Resp 18   Ht 1.651 m (5' 5\")   Wt 118.5 kg (261 lb 4.8 oz)   LMP 06/23/2019 (Within Months)   SpO2 96%   BMI 43.48 kg/m     "

## 2020-07-24 NOTE — PLAN OF CARE
PRIMARY DIAGNOSIS: Cholelithiasis w/ Transaminitis  OUTPATIENT/OBSERVATION GOALS TO BE MET BEFORE DISCHARGE:     1. Pain status: Improved.  2. Stable vital signs and labs (if performed) at disposition: Yes.  3. Tolerating adequate PO diet: No. Remains NPO. Intermittent nausea. Given IV Zofran and Compazine w/ relief.   4. Successful cholecystectomy or clear follow up plan with General Surgery team if immediate surgery not performed: Yes.   5. ADLs back to baseline?  Yes  6. Activity and level of assistance: Ambulating independently.  7. Barriers to discharge noted: No     Discharge Planner Nurse   Safe discharge environment identified: Yes  Barriers to discharge: Yes       Entered by: Mitali Goodwin 07/23/2020 4:00 PM     Patient is CE for procedure.      Please review provider order for any additional goals.   Nurse to notify provider when observation goals have been met and patient is ready for discharge.

## 2020-07-24 NOTE — PROGRESS NOTES
"[facility]   General Surgery Progress Note           Assessment and Plan:   Assessment:   POD#1 s/p Procedure(s):  CHOLECYSTECTOMY, LAPAROSCOPIC, WITH CHOLANGIOGRAM  No issues      Plan:   -OK to DC today. DC Rx: done by Hospitalist.  OT bowel program prn.  I will call patient in 2-3 weeks for postop appt.  Discharge instructions were reviewed with the patient in detail.  All her questions/concerns were addressed.  She is aware that a printed copy of instructions will be given to her upon discharge.         Interval History:   Mild soreness at incisions, otherwise doing well.  Tolerating diet.  No concerns.         Physical Exam:   Blood pressure (!) 149/84, pulse 76, temperature 95.9  F (35.5  C), temperature source Oral, resp. rate 16, height 1.651 m (5' 5\"), weight 118.5 kg (261 lb 4.8 oz), last menstrual period 06/23/2019, SpO2 97 %.    I/O last 3 completed shifts:  In: 1900 [I.V.:1900]  Out: 405 [Urine:400; Blood:5]    Abdomen:   soft, non-distended, tenderness noted near umbilical site   Incs - clean, dry, steri-strips intact              Data:     Recent Labs   Lab 07/22/20  1309   WBC 9.9   HGB 13.7   HCT 42.4   MCV 94        Recent Labs   Lab 07/24/20  0640 07/23/20  0556 07/22/20  1309   NA  --  140 139   POTASSIUM  --  4.2 3.9   CHLORIDE  --  107 107   CO2  --  29 28   ANIONGAP  --  4 4   GLC  --  116* 121*   BUN  --  7 8   CR  --  0.72 0.70   GFRESTIMATED  --  >90 >90   GFRESTBLACK  --  >90 >90   WALTER  --  8.6 9.5   PROTTOTAL 6.3* 6.7* 7.2   ALBUMIN 3.1* 3.4 3.7   BILITOTAL 0.6 0.9 0.9   ALKPHOS 159* 183* 161*   AST 94* 481* 415*   * 562* 243*       Alena Kilgore PA-C     "

## 2020-07-24 NOTE — PLAN OF CARE
"PRIMARY DIAGNOSIS: Lap Montserrat  OUTPATIENT/OBSERVATION GOALS TO BE MET BEFORE DISCHARGE:  1. Stable vital signs Yes, higher SBPs, coming down  2. Tolerating diet:Yes, tolerated fluids and jello  3. Pain controlled with oral pain medications:  Yes, Scheduled Tylenol and PRN oxycodonex1, will cont. To monitor.   4. Positive bowel sounds:  Yes  5. Voiding without difficulty:  Yes  6. Able to ambulate:  Yes, up SBAx1  7. Provider specific discharge goals met:  No, advance diet further    Discharge Planner Nurse   Safe discharge environment identified: Yes  Barriers to discharge: Yes       Entered by: Nathalia Tejeda 07/24/2020      Please review provider order for any additional goals.   Nurse to notify provider when observation goals have been met and patient is ready for discharge.    Patient on RA, satting in upper 90s, patient refusing capno in order to wear home CPAP comfortably. Patient pain being controlled with scheduled Tylenol ice and PRN oxycodone. Patient LS clear, BS quiet, denies passing flatus, denies nausea. 5 lap montserrat sites intact with steri strips having minimal amount of bloody drainage. Patient currently appears to be resting comfortably with PCDs on.     BP (!) 157/82 (BP Location: Right arm)   Pulse 76   Temp 96.2  F (35.7  C) (Oral)   Resp 16   Ht 1.651 m (5' 5\")   Wt 118.5 kg (261 lb 4.8 oz)   LMP 06/23/2019 (Within Months)   SpO2 97%   BMI 43.48 kg/m     "

## 2020-07-24 NOTE — DISCHARGE SUMMARY
[facility] / Observation Unit  Discharge Summary        Nan Stinson MRN# [id number]   YOB: 1965 Age: 54 year old     Date of Admission:  7/22/2020  Date of Discharge:  7/24/2020  Admitting Physician:  David Jiménez MD  Discharge Physician: Adriana Beckett PA-C  Discharging Service: Hospitalist      Primary Provider: Casie [facility]  Primary Care Physician Phone Number: [phone]         Primary Discharge Diagnoses:    Nan Stinson was admitted on 7/22/2020 for intractable biliary colic/acute cholecystitis.     1. Intractable biliary colic/acute cholecystitis - LFTs elevated concerning for choledocholithiasis but RUQ US did not show concerning findings for this and intraoperative cholangiograms were negative. Pt tolerated the surgery well.  2. S/p lap cholecystectomy  3. HTN - no previous dx of this. BPs significantly elevated on admission, pain and anxiety likely contributing, but pt was started on an antihypertensive, amlodipine. Recommend checking blood pressures at home daily and follow up with PCP for further management.   4. Chest pain - pt had an episode of chest pain during admission, likely related to biliary colic but BPs were elevated at the time and chest pain improved with reduction of blood pressure. Discuss further with PCP, if chest pain reoccurs or persists, consider stress testing.           Secondary Discharge Diagnoses:   History reviewed. No pertinent past medical history.             Code Status:      Full Code        Brief Hospital Summary:        Reason for your hospital stay      You were admitted for concerns of stomach pain and nausea/vomiting   related to your gallbladder. Imaging in the ER should that your   gallbladder had gall stones. Your pain and nausea was controlled. You were   seen by surgery who opted to surgically remove your gallbladder. You were   doing fine post operatively to allowed to discharge home. Your  blood   pressure was also noted to be elevated and you were started on a blood   pressure medication.             Please refer to initial admission history and physical for further details.   Briefly, Nan Stinson was admitted on 7/22/2020 with intractable biliary colic/acute cholecystitis.   Initial work up in the ED did not reveal evidence of sepsis to require inpatient hospitalization. Pt was registered to the Observation Unit for pain control and supportive measures.     Pt was resuscitated with IVF, and anti-emetics. Laboratory and imaging results indicated: LFTs are elevated, lipase is normal and WBC is normal. RUQ US shows cholelithiasis without biliary dilation. General surgery was consulted. Patient underwent laparoscopic cholecystectomy (please see detailed operative report). Post -op, pt did well. Pain control was transitioned from IV to PO form. At the time of discharge, pt's pain was controlled and was able to tolerate PO intake.  Medications were reviewed. Pt is instructed to follow up as below.               Significant Lab During Hospitalization:      Recent Labs   Lab 07/22/20  1309   WBC 9.9   HGB 13.7   HCT 42.4   MCV 94        Recent Labs   Lab 07/24/20  0640 07/23/20  0556 07/22/20  1309   NA  --  140 139   POTASSIUM  --  4.2 3.9   CHLORIDE  --  107 107   CO2  --  29 28   ANIONGAP  --  4 4   GLC  --  116* 121*   BUN  --  7 8   CR  --  0.72 0.70   GFRESTIMATED  --  >90 >90   GFRESTBLACK  --  >90 >90   WALTER  --  8.6 9.5   PROTTOTAL 6.3* 6.7* 7.2   ALBUMIN 3.1* 3.4 3.7   BILITOTAL 0.6 0.9 0.9   ALKPHOS 159* 183* 161*   AST 94* 481* 415*   * 562* 243*     Recent Labs   Lab 07/22/20  1309   LIPASE 129     Recent Labs   Lab 07/23/20  1020 07/23/20  0556 07/23/20  0148   TROPI <0.015 <0.015 <0.015     Recent Labs   Lab 07/22/20  1309   COLOR Light Yellow   APPEARANCE Clear   URINEGLC Negative   URINEBILI Negative   URINEKETONE Negative   SG 1.005   UBLD Negative   URINEPH 7.0    PROTEIN Negative   NITRITE Negative   LEUKEST Negative   RBCU 0   WBCU 1                Significant Imaging During Hospitalization:      Recent Results (from the past 48 hour(s))   Echocardiogram Complete    Narrative    [id number]  Formerly [facility]  [id number]  [id number]^CHASTITY^MIQUEL^R           [facility]  Echocardiography Laboratory  [address] [place] [address]        Name: MARK ARELLANO  MRN: [id number]  : 1965  Study Date: 2020 09:07 AM  Age: 54 yrs  Gender: Female  Patient Location: [facility]  Reason For Study: Chest Pain  Ordering Physician: MIQUEL HAYWOOD  Performed By: Peyton Khan     BSA: 2.2 m2  Height: 65 in  Weight: 261 lb  HR: 60  BP: 161/71 mmHg  _____________________________________________________________________________  __        Procedure  Complete Portable Echo Adult. Optison (NDC #[id number]) given intravenously.  Technically difficult study.  _____________________________________________________________________________  __        Interpretation Summary     Left ventricular systolic function is normal.  The visual ejection fraction is estimated at 55-60%.  The study was technically difficult. There is no comparison study available.  _____________________________________________________________________________  __        Left Ventricle  The left ventricle is normal in size. There is normal left ventricular wall  thickness. Left ventricular systolic function is normal. The visual ejection  fraction is estimated at 55-60%. Left ventricular diastolic function is  indeterminate. No regional wall motion abnormalities noted.     Right Ventricle  The right ventricle is normal size. The right ventricular systolic function is  normal.     Atria  Normal left atrial size. Right atrial size is normal.     Mitral Valve  There is mild mitral annular calcification. There is trace mitral  regurgitation.        Tricuspid Valve  The tricuspid valve is not well visualized. No  tricuspid regurgitation. Right  ventricular systolic pressure could not be approximated due to inadequate  tricuspid regurgitation. IVC diameter >2.1 cm collapsing <50% with sniff  suggests a high RA pressure estimated at 15 mmHg or greater.     Aortic Valve  The aortic valve is trileaflet. No aortic regurgitation is present. No aortic  stenosis is present.     Pulmonic Valve  The pulmonic valve is not well visualized.     Vessels  The aortic root is normal size.     Pericardium  There is no pericardial effusion.        Rhythm  Sinus rhythm was noted.  _____________________________________________________________________________  __  MMode/2D Measurements & Calculations     IVSd: 1.0 cm  LVIDd: 4.4 cm  LVIDs: 2.9 cm  LVPWd: 0.88 cm  FS: 33.9 %  LV mass(C)d: 138.2 grams  LV mass(C)dI: 62.4 grams/m2  Ao root diam: 3.1 cm  LA dimension: 3.8 cm  asc Aorta Diam: 3.2 cm  LA/Ao: 1.2  LVOT diam: 2.0 cm  LVOT area: 3.2 cm2  LA Volume (BP): 54.0 ml  LA Volume Index (BP): 24.3 ml/m2  RWT: 0.40           Doppler Measurements & Calculations  MV E max kim: 108.6 cm/sec  MV A max kim: 83.4 cm/sec  MV E/A: 1.3  MV dec time: 0.18 sec  LV V1 max P.0 mmHg  LV V1 max: 112.3 cm/sec  LV V1 VTI: 29.1 cm  SV(LVOT): 93.8 ml  SI(LVOT): 42.3 ml/m2  PA acc time: 0.12 sec  E/E' av.7  Lateral E/e': 9.2  Medial E/e': 14.3              _____________________________________________________________________________  __        Report approved by: Nate Richardson 2020 10:14 AM      XR Surgery OH L/T 5 Min Fluoro w Stills    Narrative    EXAM: FLUOROSCOPY DURING INTRAOPERATIVE CHOLANGIOGRAM 10 VIEWS  LOCATION: [facility]  DATE/TIME: 2020 3:49 PM    INDICATION: Laparoscopic cholecystectomy.  COMPARISON: None.    TECHNIQUE: 10 images were acquired during an intraoperative cholangiogram performed by the surgeon. Fluoroscopy time 0.2 minutes.      Impression    IMPRESSION:   1. Moderate dilatation of the common bile  duct and intrahepatic bile ducts.  2. No visualized choledocholithiasis.              Pending Results:        Unresulted Labs Ordered in the Past 30 Days of this Admission     Date and Time Order Name Status Description    7/23/2020 1723 Surgical pathology exam In process               Consultations This Hospital Stay:      Consultation during this admission received from surgery         Discharge Instructions and Follow-Up:      Follow-up Appointments     Follow-up and recommended labs and tests       Follow up with primary care provider within 7 days to evaluate medication   change and to evaluate after surgery. You will need to establish care with   a new PCP. The following labs/tests are recommended: CMP, blood pressure   recheck.  Discuss lifestyle changes to help improve blood pressure.                 Discharge Disposition:      Discharged to home         Discharge Medications:        Current Discharge Medication List      START taking these medications    Details   amLODIPine (NORVASC) 5 MG tablet Take 1 tablet (5 mg) by mouth daily Hold this medication if your systolic blood pressure is under 120.  Qty: 30 tablet, Refills: 0    Associated Diagnoses: Elevated blood pressure reading      oxyCODONE (ROXICODONE) 5 MG tablet Take 1 tablet (5 mg) by mouth every 6 hours as needed for moderate to severe pain  Qty: 10 tablet, Refills: 0    Associated Diagnoses: S/P laparoscopic cholecystectomy         CONTINUE these medications which have NOT CHANGED    Details   acetaminophen (TYLENOL) 325 MG tablet Take 325-650 mg by mouth every 6 hours as needed for mild pain      coenzyme Q-10 200 MG CAPS Take 1 capsule by mouth daily         STOP taking these medications       UNABLE TO FIND Comments:   Reason for Stopping:                   Allergies:       No Known Allergies        Condition and Physical on Discharge:      Discharge condition: Stable   Vitals: Blood pressure (!) 149/84, pulse 76, temperature 95.9  F (35.5  " C), temperature source Oral, resp. rate 16, height 1.651 m (5' 5\"), weight 118.5 kg (261 lb 4.8 oz), last menstrual period 06/23/2019, SpO2 97 %.  261 lbs 4.8 oz      GENERAL:  Comfortable.  PSYCH: pleasant, oriented, No acute distress.  HEART:  RRR  LUNGS:  Normal Respiratory effort.   EXTREMITIES:  Able to ambulate independently.  SKIN:  Dry to touch, No rash, wound or ulcerations.  NEUROLOGIC:  Grossly intact    Adriana Beckett PA-C   "

## 2020-07-24 NOTE — PLAN OF CARE
Patient's After Visit Summary was reviewed with patient.   Patient verbalized understanding of After Visit Summary, recommended follow up and was given an opportunity to ask questions.   Discharge medications sent home with patient.   Discharged with significant other and all belongings.      OBSERVATION patient END time: 1237

## 2020-07-24 NOTE — PLAN OF CARE
"PRIMARY DIAGNOSIS: BILIARY COLIC/UNCOMPLICATED EARLY ACUTE CHOLECYSTITIS  OUTPATIENT/OBSERVATION GOALS TO BE MET BEFORE DISCHARGE:    1. Pain status: Improved-controlled with oral pain medications.  2. Stable vital signs and labs (if performed) at disposition: Yes  3. Tolerating adequate PO diet: Yes  4. Successful cholecystectomy or clear follow up plan with General Surgery team if immediate surgery not performed Yes  5. ADLs back to baseline?  Yes  6. Activity and level of assistance: Ambulating independently.  7. Barriers to discharge noted Yes not medically cleared    Discharge Planner Nurse   Safe discharge environment identified: Yes  Barriers to discharge: Yes       Entered by: Leny Driscoll 07/24/2020 8:15 AM     Please review provider order for any additional goals.   Nurse to notify provider when observation goals have been met and patient is ready for discharge.    Patient is alert and oriented x4. VS WNL and documented on the FS. Lung sounds clear in all lobes and patient is on RA. Denies SOB. Active bowel sounds in all 4 quadrants with LBM Wednesday. Patient denies any pain, urgency, and frequency when voiding. SL. Advanced diet from clears to regular. Abdominal incisions x5 are dry and intact. Steri strips in place. Ice packs being utilized. Patient rating pain a 2-3/10 and is being treated with Tylenol. Patient up independent. Tele in place and patient is SB/SR HR 50-60's.     BP (!) 149/84 (BP Location: Right arm)   Pulse 76   Temp 95.9  F (35.5  C) (Oral)   Resp 16   Ht 1.651 m (5' 5\")   Wt 118.5 kg (261 lb 4.8 oz)   LMP 06/23/2019 (Within Months)   SpO2 97%   BMI 43.48 kg/m      "

## 2020-07-27 LAB — COPATH REPORT: NORMAL

## 2020-08-05 ENCOUNTER — TELEPHONE (OUTPATIENT)
Dept: SURGERY | Facility: CLINIC | Age: 55
End: 2020-08-05

## 2020-08-05 DIAGNOSIS — Z98.890 POSTOPERATIVE STATE: Primary | ICD-10-CM

## 2020-08-05 NOTE — TELEPHONE ENCOUNTER
[facility]   Postoperative Follow-up Phone Call  -Call to patient to review recent procedure and recovery    Attempted to call patient for post op check.  No answer.  Message was left for patient to call back if they had any questions of concerns.      Not signed up with [facility], unable to send message.    Alena Kilgore PA-C

## 2020-08-12 NOTE — ANESTHESIA POSTPROCEDURE EVALUATION
Patient: Nan Stinson    Procedure(s):  CHOLECYSTECTOMY, LAPAROSCOPIC, WITH CHOLANGIOGRAM    Diagnosis:Gall stones [K80.20]  Diagnosis Additional Information: No value filed.    Anesthesia Type:  General    Note:  Anesthesia Post Evaluation    Patient location during evaluation: PACU  Patient participation: Able to fully participate in evaluation  Level of consciousness: awake  Pain management: adequate  Airway patency: patent  Cardiovascular status: acceptable  Respiratory status: acceptable  Hydration status: acceptable  PONV: controlled     Anesthetic complications: None          Last vitals:  Vitals:    07/24/20 0357 07/24/20 0748 07/24/20 1153   BP: (!) 157/82 (!) 149/84 (!) 155/77   Pulse:   62   Resp: 16 16 14   Temp: 96.2  F (35.7  C) 95.9  F (35.5  C) 96.1  F (35.6  C)   SpO2: 97% 97% 99%         Electronically Signed By: Scooby Hughes MD  August 12, 2020  4:09 PM

## (undated) DEVICE — SOL NACL 0.9% IRRIG 1000ML BOTTLE 2F7124

## (undated) DEVICE — ENDO TROCAR SLEEVE KII Z-THREADED 05X100MM CTS02

## (undated) DEVICE — SU VICRYL 0 CT-2 CR 8X18" J727D

## (undated) DEVICE — SUCTION IRR STRYKERFLOW II W/TIP 250-070-520

## (undated) DEVICE — LINEN TOWEL PACK X5 5464

## (undated) DEVICE — ESU ELEC BLADE 2.75" COATED/INSULATED E1455

## (undated) DEVICE — LINEN HALF SHEET 5512

## (undated) DEVICE — ENDO TROCAR OPTICAL ACCESS KII Z-THRD 12X100MM C0R85

## (undated) DEVICE — LINEN POUCH DBL 5427

## (undated) DEVICE — CATH IV ANGIO INTRO 12GA 382277

## (undated) DEVICE — PREP CHLORAPREP 26ML TINTED ORANGE  260815

## (undated) DEVICE — CONNECTOR STOPCOCK 3 WAY MALE LL HI-FLO MX9311L

## (undated) DEVICE — ESU CORD MONOPOLAR 10'  E0510

## (undated) DEVICE — CATH CHOLANGIOGRAM 4.5FR TAUT METAL TIP 20018-M55

## (undated) DEVICE — Device

## (undated) DEVICE — SOL NACL 0.9% INJ 250ML BAG 2B1322Q

## (undated) DEVICE — DECANTER BAG 2002S

## (undated) DEVICE — LINEN FULL SHEET 5511

## (undated) DEVICE — SU VICRYL 4-0 P-3 18" UND J494G

## (undated) DEVICE — ESU GROUND PAD ADULT W/CORD E7507

## (undated) DEVICE — BAG CLEAR TRASH 1.3M 39X33" P4040C

## (undated) DEVICE — GLOVE PROTEXIS POWDER FREE 8.0 ORTHOPEDIC 2D73ET80

## (undated) DEVICE — DRAPE C-ARM 60X42" 1013

## (undated) DEVICE — SYR 30ML LL W/O NDL 302832

## (undated) DEVICE — SOL NACL 0.9% IRRIG 3000ML BAG 2B7477

## (undated) DEVICE — GLOVE PROTEXIS POWDER FREE SMT 7.5  2D72PT75X

## (undated) DEVICE — DRAPE LEGGINGS 8421

## (undated) DEVICE — ESU PENCIL W/HOLSTER E2350H

## (undated) DEVICE — SUCTION LAPAROSCOPIC SMOKE EVAC SYSTEM SEL7000A

## (undated) DEVICE — TUBING IV EXTENSION SET ANESTHESIA 34" MLL 2C6227

## (undated) DEVICE — RAD RX ISOVUE 300 (50ML) 61% IOPAMIDOL CHARGE PER ML

## (undated) DEVICE — ENDO POUCH UNIV RETRIEVAL SYSTEM INZII 10MM CD001

## (undated) DEVICE — ENDO TROCAR FIRST ENTRY KII FIOS Z-THRD 05X100MM CTF03

## (undated) DEVICE — DECANTER VIAL 2006S

## (undated) RX ORDER — ATROPINE SULFATE 0.4 MG/ML
AMPUL (ML) INJECTION
Status: DISPENSED
Start: 2020-07-23

## (undated) RX ORDER — FENTANYL CITRATE 50 UG/ML
INJECTION, SOLUTION INTRAMUSCULAR; INTRAVENOUS
Status: DISPENSED
Start: 2020-07-23

## (undated) RX ORDER — ONDANSETRON 2 MG/ML
INJECTION INTRAMUSCULAR; INTRAVENOUS
Status: DISPENSED
Start: 2020-07-23

## (undated) RX ORDER — LIDOCAINE HYDROCHLORIDE 10 MG/ML
INJECTION, SOLUTION EPIDURAL; INFILTRATION; INTRACAUDAL; PERINEURAL
Status: DISPENSED
Start: 2020-07-23

## (undated) RX ORDER — KETOROLAC TROMETHAMINE 30 MG/ML
INJECTION, SOLUTION INTRAMUSCULAR; INTRAVENOUS
Status: DISPENSED
Start: 2020-07-23

## (undated) RX ORDER — BUPIVACAINE HYDROCHLORIDE AND EPINEPHRINE 5; 5 MG/ML; UG/ML
INJECTION, SOLUTION EPIDURAL; INTRACAUDAL; PERINEURAL
Status: DISPENSED
Start: 2020-07-23

## (undated) RX ORDER — GLYCOPYRROLATE 0.2 MG/ML
INJECTION INTRAMUSCULAR; INTRAVENOUS
Status: DISPENSED
Start: 2020-07-23

## (undated) RX ORDER — CEFAZOLIN SODIUM 2 G/100ML
INJECTION, SOLUTION INTRAVENOUS
Status: DISPENSED
Start: 2020-07-23

## (undated) RX ORDER — PROPOFOL 10 MG/ML
INJECTION, EMULSION INTRAVENOUS
Status: DISPENSED
Start: 2020-07-23